# Patient Record
Sex: MALE | Race: BLACK OR AFRICAN AMERICAN | NOT HISPANIC OR LATINO | Employment: FULL TIME | URBAN - METROPOLITAN AREA
[De-identification: names, ages, dates, MRNs, and addresses within clinical notes are randomized per-mention and may not be internally consistent; named-entity substitution may affect disease eponyms.]

---

## 2018-12-18 ENCOUNTER — OFFICE VISIT (OUTPATIENT)
Dept: URGENT CARE | Facility: PHYSICIAN GROUP | Age: 73
End: 2018-12-18
Payer: COMMERCIAL

## 2018-12-18 VITALS
DIASTOLIC BLOOD PRESSURE: 80 MMHG | BODY MASS INDEX: 23.98 KG/M2 | HEART RATE: 92 BPM | RESPIRATION RATE: 20 BRPM | SYSTOLIC BLOOD PRESSURE: 142 MMHG | WEIGHT: 177 LBS | TEMPERATURE: 100 F | HEIGHT: 72 IN | OXYGEN SATURATION: 97 %

## 2018-12-18 DIAGNOSIS — J01.00 ACUTE NON-RECURRENT MAXILLARY SINUSITIS: ICD-10-CM

## 2018-12-18 PROCEDURE — 99204 OFFICE O/P NEW MOD 45 MIN: CPT | Performed by: FAMILY MEDICINE

## 2018-12-18 RX ORDER — BENZONATATE 100 MG/1
100 CAPSULE ORAL 3 TIMES DAILY PRN
Qty: 60 CAP | Refills: 0 | Status: SHIPPED | OUTPATIENT
Start: 2018-12-18 | End: 2020-11-07

## 2018-12-18 RX ORDER — AMOXICILLIN AND CLAVULANATE POTASSIUM 875; 125 MG/1; MG/1
1 TABLET, FILM COATED ORAL 2 TIMES DAILY
Qty: 14 TAB | Refills: 0 | Status: SHIPPED | OUTPATIENT
Start: 2018-12-18 | End: 2018-12-25

## 2018-12-18 NOTE — PROGRESS NOTES
Subjective:     Ulysses Coleman is a 73 y.o. male who presents for Cough ([id number].)       Cough   This is a new problem. The current episode started 1 to 4 weeks ago. The problem has been rapidly worsening. The problem occurs every few minutes. The cough is productive of sputum. Associated symptoms include nasal congestion and postnasal drip. Pertinent negatives include no chills, fever, rhinorrhea, sore throat or wheezing.   History reviewed. No pertinent past medical history.History reviewed. No pertinent surgical history.  Social History     Social History   • Marital status:      Spouse name: N/A   • Number of children: N/A   • Years of education: N/A     Occupational History   • Not on file.     Social History Main Topics   • Smoking status: Never Smoker   • Smokeless tobacco: Never Used   • Alcohol use No   • Drug use: No   • Sexual activity: Not on file     Other Topics Concern   • Not on file     Social History Narrative   • No narrative on file      Family History   Problem Relation Age of Onset   • Stroke Neg Hx    • Heart Disease Neg Hx     Review of Systems   Constitutional: Negative for chills and fever.   HENT: Positive for postnasal drip and sinus pain. Negative for rhinorrhea and sore throat.    Respiratory: Positive for cough. Negative for wheezing.    No Known Allergies   Objective:   /80   Pulse 92   Temp 37.8 °C (100 °F) (Temporal)   Resp 20   Ht 1.829 m (6')   Wt 80.3 kg (177 lb)   SpO2 97%   BMI 24.01 kg/m²   Physical Exam   Constitutional: He is oriented to person, place, and time. He appears well-developed and well-nourished. No distress.   HENT:   Head: Normocephalic and atraumatic.   Nose: Mucosal edema and rhinorrhea present. Right sinus exhibits maxillary sinus tenderness. Left sinus exhibits maxillary sinus tenderness.   Eyes: Pupils are equal, round, and reactive to light. Conjunctivae and EOM are normal.   Cardiovascular: Normal rate and regular rhythm.     No murmur heard.  Pulmonary/Chest: Effort normal and breath sounds normal. No respiratory distress.   Abdominal: Soft. He exhibits no distension. There is no tenderness.   Neurological: He is alert and oriented to person, place, and time. He has normal reflexes. No sensory deficit.   Skin: Skin is warm and dry.   Psychiatric: He has a normal mood and affect.   Vitals reviewed.        Assessment/Plan:   Assessment    1. Acute non-recurrent maxillary sinusitis  - amoxicillin-clavulanate (AUGMENTIN) 875-125 MG Tab; Take 1 Tab by mouth 2 times a day for 7 days.  Dispense: 14 Tab; Refill: 0  - benzonatate (TESSALON) 100 MG Cap; Take 1 Cap by mouth 3 times a day as needed for Cough.  Dispense: 60 Cap; Refill: 0    Other orders  - DM-Doxylamine-Acetaminophen (NYQUIL COLD & FLU PO); Take  by mouth.    Differential diagnosis, natural history, supportive care, and indications for immediate follow-up discussed.

## 2018-12-18 NOTE — PATIENT INSTRUCTIONS

## 2018-12-31 ASSESSMENT — ENCOUNTER SYMPTOMS
WHEEZING: 0
SINUS PAIN: 1
CHILLS: 0
RHINORRHEA: 0
COUGH: 1
FEVER: 0
SORE THROAT: 0

## 2019-04-01 ENCOUNTER — HOSPITAL ENCOUNTER (OUTPATIENT)
Dept: LAB | Facility: MEDICAL CENTER | Age: 74
End: 2019-04-01
Attending: FAMILY MEDICINE
Payer: COMMERCIAL

## 2019-04-01 ENCOUNTER — HOSPITAL ENCOUNTER (OUTPATIENT)
Facility: MEDICAL CENTER | Age: 74
End: 2019-04-01
Attending: FAMILY MEDICINE
Payer: COMMERCIAL

## 2019-04-01 LAB
25(OH)D3 SERPL-MCNC: 33 NG/ML (ref 30–100)
ALBUMIN SERPL BCP-MCNC: 4.1 G/DL (ref 3.2–4.9)
ALBUMIN/GLOB SERPL: 1.5 G/DL
ALP SERPL-CCNC: 53 U/L (ref 30–99)
ALT SERPL-CCNC: 14 U/L (ref 2–50)
ANION GAP SERPL CALC-SCNC: 4 MMOL/L (ref 0–11.9)
AST SERPL-CCNC: 21 U/L (ref 12–45)
BASOPHILS # BLD AUTO: 0.9 % (ref 0–1.8)
BASOPHILS # BLD: 0.03 K/UL (ref 0–0.12)
BILIRUB SERPL-MCNC: 0.7 MG/DL (ref 0.1–1.5)
BUN SERPL-MCNC: 17 MG/DL (ref 8–22)
CALCIUM SERPL-MCNC: 9.1 MG/DL (ref 8.5–10.5)
CHLORIDE SERPL-SCNC: 105 MMOL/L (ref 96–112)
CHOLEST SERPL-MCNC: 176 MG/DL (ref 100–199)
CO2 SERPL-SCNC: 29 MMOL/L (ref 20–33)
CREAT SERPL-MCNC: 0.83 MG/DL (ref 0.5–1.4)
EOSINOPHIL # BLD AUTO: 0.08 K/UL (ref 0–0.51)
EOSINOPHIL NFR BLD: 2.3 % (ref 0–6.9)
ERYTHROCYTE [DISTWIDTH] IN BLOOD BY AUTOMATED COUNT: 47.1 FL (ref 35.9–50)
GLOBULIN SER CALC-MCNC: 2.8 G/DL (ref 1.9–3.5)
GLUCOSE SERPL-MCNC: 96 MG/DL (ref 65–99)
HCT VFR BLD AUTO: 45.5 % (ref 42–52)
HDLC SERPL-MCNC: 59 MG/DL
HGB BLD-MCNC: 14.6 G/DL (ref 14–18)
IMM GRANULOCYTES # BLD AUTO: 0.01 K/UL (ref 0–0.11)
IMM GRANULOCYTES NFR BLD AUTO: 0.3 % (ref 0–0.9)
LDLC SERPL CALC-MCNC: 100 MG/DL
LYMPHOCYTES # BLD AUTO: 1.27 K/UL (ref 1–4.8)
LYMPHOCYTES NFR BLD: 37 % (ref 22–41)
MCH RBC QN AUTO: 29.7 PG (ref 27–33)
MCHC RBC AUTO-ENTMCNC: 32.1 G/DL (ref 33.7–35.3)
MCV RBC AUTO: 92.5 FL (ref 81.4–97.8)
MONOCYTES # BLD AUTO: 0.36 K/UL (ref 0–0.85)
MONOCYTES NFR BLD AUTO: 10.5 % (ref 0–13.4)
NEUTROPHILS # BLD AUTO: 1.68 K/UL (ref 1.82–7.42)
NEUTROPHILS NFR BLD: 49 % (ref 44–72)
NRBC # BLD AUTO: 0 K/UL
NRBC BLD-RTO: 0 /100 WBC
PLATELET # BLD AUTO: 173 K/UL (ref 164–446)
PMV BLD AUTO: 11.3 FL (ref 9–12.9)
POTASSIUM SERPL-SCNC: 4.5 MMOL/L (ref 3.6–5.5)
PROT SERPL-MCNC: 6.9 G/DL (ref 6–8.2)
PSA SERPL-MCNC: 0.75 NG/ML (ref 0–4)
RBC # BLD AUTO: 4.92 M/UL (ref 4.7–6.1)
SODIUM SERPL-SCNC: 138 MMOL/L (ref 135–145)
TRIGL SERPL-MCNC: 83 MG/DL (ref 0–149)
WBC # BLD AUTO: 3.4 K/UL (ref 4.8–10.8)

## 2019-04-01 PROCEDURE — 82306 VITAMIN D 25 HYDROXY: CPT

## 2019-04-01 PROCEDURE — 84153 ASSAY OF PSA TOTAL: CPT

## 2019-04-01 PROCEDURE — 82272 OCCULT BLD FECES 1-3 TESTS: CPT

## 2019-04-01 PROCEDURE — 80061 LIPID PANEL: CPT

## 2019-04-01 PROCEDURE — 80053 COMPREHEN METABOLIC PANEL: CPT

## 2019-04-01 PROCEDURE — 85025 COMPLETE CBC W/AUTO DIFF WBC: CPT

## 2019-04-01 PROCEDURE — 36415 COLL VENOUS BLD VENIPUNCTURE: CPT

## 2019-04-02 LAB — HEMOCCULT STL QL: NEGATIVE

## 2019-08-26 ENCOUNTER — OFFICE VISIT (OUTPATIENT)
Dept: URGENT CARE | Facility: PHYSICIAN GROUP | Age: 74
End: 2019-08-26
Payer: COMMERCIAL

## 2019-08-26 VITALS
RESPIRATION RATE: 16 BRPM | WEIGHT: 170 LBS | OXYGEN SATURATION: 99 % | BODY MASS INDEX: 23.03 KG/M2 | TEMPERATURE: 97.8 F | HEIGHT: 72 IN | HEART RATE: 70 BPM | SYSTOLIC BLOOD PRESSURE: 152 MMHG | DIASTOLIC BLOOD PRESSURE: 102 MMHG
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
## 2019-08-26 PROCEDURE — 99214 OFFICE O/P EST MOD 30 MIN: CPT | Performed by: PHYSICIAN ASSISTANT

## 2019-08-26 RX ORDER — CEPHALEXIN 500 MG/1
500 CAPSULE ORAL 3 TIMES DAILY
Qty: 30 CAP | Refills: 0 | Status: SHIPPED | OUTPATIENT
Start: 2019-08-26 | End: 2019-09-05

## 2019-08-26 ASSESSMENT — ENCOUNTER SYMPTOMS
FEVER: 0
FATIGUE: 0
SHORTNESS OF BREATH: 0
SORE THROAT: 0
COUGH: 0

## 2019-08-27 NOTE — PROGRESS NOTES
Subjective:   Ulysses Coleman is a 73 y.o. male who presents for Bee Sting (R [id number] )        This is a new problem.  Patient complains of right hand redness and swelling x1 day.  He states that he was stung by the on dorsal aspect of right hand 2 days ago.  Symptoms are worsening.    Rash   This is a new problem. The current episode started yesterday. The problem has been rapidly worsening since onset. The affected locations include the right hand. The rash is characterized by pain, redness and swelling (warmth). Associated with: insect. Pertinent negatives include no congestion, cough, fatigue, fever, shortness of breath or sore throat. Past treatments include nothing. The treatment provided no relief. There is no history of allergies.     Review of Systems   Constitutional: Negative for fatigue and fever.   HENT: Negative for congestion and sore throat.    Respiratory: Negative for cough and shortness of breath.    Skin: Positive for rash.       PMH:  has no past medical history of Asthma, Cancer (HCC), Diabetes (HCC), Hyperlipidemia, or Hypertension.  MEDS:   Current Outpatient Medications:   •  cephALEXin (KEFLEX) 500 MG Cap, Take 1 Cap by mouth 3 times a day for 10 days., Disp: 30 Cap, Rfl: 0  •  DM-Doxylamine-Acetaminophen (NYQUIL COLD & FLU PO), Take  by mouth., Disp: , Rfl:   •  benzonatate (TESSALON) 100 MG Cap, Take 1 Cap by mouth 3 times a day as needed for Cough. (Patient not taking: Reported on 8/26/2019), Disp: 60 Cap, Rfl: 0  ALLERGIES: No Known Allergies  SURGHX: No past surgical history on file.  SOCHX:  reports that he has never smoked. He has never used smokeless tobacco. He reports that he does not drink alcohol or use drugs.  FH: Family history was reviewed, no pertinent findings to report   Objective:   /102   Pulse 70   Temp 36.6 °C (97.8 °F) (Temporal)   Resp 16   Ht 1.829 m (6')   Wt 77.1 kg (170 lb)   SpO2 99%   BMI 23.06 kg/m²   Physical Exam   Constitutional: He is  oriented to person, place, and time. He appears well-developed and well-nourished.  Non-toxic appearance. No distress.   HENT:   Head: Normocephalic and atraumatic.   Right Ear: External ear normal.   Left Ear: External ear normal.   Nose: Nose normal.   Eyes: Conjunctivae and lids are normal.   Neck: Neck supple.   Cardiovascular: Normal rate and regular rhythm.   Pulmonary/Chest: Effort normal and breath sounds normal. No respiratory distress.   Abdominal: Normal appearance.   Musculoskeletal:        Right hand: He exhibits tenderness and swelling. He exhibits normal range of motion, no bony tenderness, normal two-point discrimination, normal capillary refill, no deformity and no laceration. Normal sensation noted. Decreased sensation is not present in the ulnar distribution, is not present in the medial distribution and is not present in the radial distribution. Normal strength noted.   Normal range of motion. Exhibits no edema and no tenderness.    Neurological: He is alert and oriented to person, place, and time. No cranial nerve deficit or sensory deficit.   Skin: Skin is warm, dry and intact. Capillary refill takes less than 2 seconds.   Moderate edema, erythema, and warmth of the dorsal aspect of right hand.  There is a small central punctate lesion on the lateral aspect.   Psychiatric: He has a normal mood and affect. His speech is normal and behavior is normal. Judgment and thought content normal. Cognition and memory are normal.   Vitals reviewed.        Assessment/Plan:   1. Bug bite with infection, initial encounter  - cephALEXin (KEFLEX) 500 MG Cap; Take 1 Cap by mouth 3 times a day for 10 days.  Dispense: 30 Cap; Refill: 0    Pt instructed to complete full course of medication despite symptomatic improvement. Pt to take med with meals to alleviate GI upset. Pt to take a probiotic or eat Aubrie’s yogurt/ kefir while taking the medication.    Signs of infection, to include: redness, swelling, increased  pain, purulent discharge, red streaking from wound site, N/V, and F/C discussed with patient.  Pt instructed to RTC or go to the ER if he or she should experience these.    Differential diagnosis, natural history, supportive care, and indications for immediate follow-up discussed.

## 2019-11-29 ENCOUNTER — OFFICE VISIT (OUTPATIENT)
Dept: URGENT CARE | Facility: PHYSICIAN GROUP | Age: 74
End: 2019-11-29
Payer: COMMERCIAL

## 2019-11-29 VITALS
HEART RATE: 62 BPM | SYSTOLIC BLOOD PRESSURE: 136 MMHG | DIASTOLIC BLOOD PRESSURE: 90 MMHG | TEMPERATURE: 98.3 F | OXYGEN SATURATION: 97 % | WEIGHT: 180 LBS | RESPIRATION RATE: 15 BRPM | BODY MASS INDEX: 24.38 KG/M2 | HEIGHT: 72 IN

## 2019-11-29 DIAGNOSIS — L01.00 IMPETIGO: ICD-10-CM

## 2019-11-29 PROCEDURE — 99213 OFFICE O/P EST LOW 20 MIN: CPT | Performed by: FAMILY MEDICINE

## 2019-11-29 RX ORDER — CLINDAMYCIN HYDROCHLORIDE 300 MG/1
300 CAPSULE ORAL 4 TIMES DAILY
Qty: 28 CAP | Refills: 0 | Status: SHIPPED | OUTPATIENT
Start: 2019-11-29 | End: 2019-12-06

## 2019-11-29 ASSESSMENT — ENCOUNTER SYMPTOMS
FEVER: 0
VOMITING: 0
SORE THROAT: 0
SHORTNESS OF BREATH: 0
MYALGIAS: 0
DIZZINESS: 0
NAUSEA: 0
CHILLS: 0
PAIN: 1
EYE PAIN: 0

## 2019-11-29 NOTE — LETTER
November 29, 2019         Patient: Ulysses Coleman   YOB: 1945   Date of Visit: 11/29/2019           To Whom it May Concern:    Ulysses Coleman was seen in my clinic on 11/29/2019. He may return to work on 11/30/2019..    If you have any questions or concerns, please don't hesitate to call.        Sincerely,           Niko Ackerman M.D.  Electronically Signed

## 2019-11-30 NOTE — PROGRESS NOTES
"Subjective:   Ulysses Coleman is a 74 y.o. male who presents for Pain (nose swelling)        74-year-old male presents to the urgent care with chief complaint of rash on the nose.  The patient has parents similar rash described as\" cold sores\" in the past.  Patient states the rash lesions are mildly painful and itchy.  Denies fevers chills or sweats    Pain   Associated symptoms include a rash. Pertinent negatives include no chest pain, chills, fever, myalgias, nausea, sore throat or vomiting.     Review of Systems   Constitutional: Negative for chills and fever.   HENT: Negative for sore throat.    Eyes: Negative for pain.   Respiratory: Negative for shortness of breath.    Cardiovascular: Negative for chest pain.   Gastrointestinal: Negative for nausea and vomiting.   Genitourinary: Negative for hematuria.   Musculoskeletal: Negative for myalgias.   Skin: Positive for rash.   Neurological: Negative for dizziness.     No Known Allergies   Objective:   /90   Pulse 62   Temp 36.8 °C (98.3 °F)   Resp 15   Ht 1.829 m (6')   Wt 81.6 kg (180 lb)   SpO2 97%   BMI 24.41 kg/m²   Physical Exam  Vitals signs and nursing note reviewed.   Constitutional:       General: He is not in acute distress.     Appearance: He is well-developed.   HENT:      Head: Normocephalic and atraumatic.   Eyes:      Conjunctiva/sclera: Conjunctivae normal.      Pupils: Pupils are equal, round, and reactive to light.   Cardiovascular:      Rate and Rhythm: Normal rate and regular rhythm.      Heart sounds: No murmur.   Pulmonary:      Effort: Pulmonary effort is normal. No respiratory distress.      Breath sounds: Normal breath sounds.   Abdominal:      General: There is no distension.      Palpations: Abdomen is soft.      Tenderness: There is no tenderness.   Musculoskeletal: Normal range of motion.   Skin:     General: Skin is warm and dry.      Findings: Erythema and rash (Sue-alar nasal rash, crusting lesion, trace exudate, no " vesicles) present.   Neurological:      General: No focal deficit present.      Mental Status: He is alert and oriented to person, place, and time. Mental status is at baseline.      Gait: Gait (gait at baseline) normal.   Psychiatric:         Judgment: Judgment normal.           Assessment/Plan:   1. Impetigo  - clindamycin (CLEOCIN) 300 MG Cap; Take 1 Cap by mouth 4 times a day for 7 days.  Dispense: 28 Cap; Refill: 0  - mupirocin (BACTROBAN) 2 % Ointment; Apply 1 Application to affected area(s) every day for 14 days.  Dispense: 30 g; Refill: 0        Differential diagnosis, natural history, supportive care, and indications for immediate follow-up discussed.     Advised the patient to follow-up with the primary care physician for recheck, reevaluation, and consideration of further management.

## 2019-11-30 NOTE — PATIENT INSTRUCTIONS
Impetigo, Adult  Impetigo is an infection of the skin. It commonly occurs in young children, but it can also occur in adults. The infection causes itchy blisters and sores that produce brownish-yellow fluid. As the fluid dries, it forms a thick, honey-colored crust. These skin changes usually occur on the face but can also affect other areas of the body. Impetigo usually goes away in 7-10 days with treatment.  CAUSES  Impetigo is caused by two types of bacteria. It may be caused by staphylococci or streptococci bacteria. These bacteria cause impetigo when they get under the surface of the skin. This often happens after some damage to the skin, such as damage from:  · Cuts, scrapes, or scratches.  · Insect bites, especially when you scratch the area of a bite.  · Chickenpox or other illnesses that cause open skin sores.  · Nail biting or chewing.  Impetigo is contagious and can spread easily from one person to another. This may occur through close skin contact or by sharing towels, clothing, or other items with a person who has the infection.  RISK FACTORS  Some things that can increase the risk of getting this infection include:  · Playing sports that include skin-to-skin contact with others.  · Having a skin condition with open sores.  · Having many skin cuts or scrapes.  · Living in an area that has high humidity levels.  · Having poor hygiene.  · Having high levels of staphylococci in your nose.  SIGNS AND SYMPTOMS  Impetigo usually starts out as small blisters, often on the face. The blisters then break open and turn into tiny sores (lesions) with a yellow crust. In some cases, the blisters cause itching or burning. With scratching, irritation, or lack of treatment, these small lesions may get larger. Scratching can also cause impetigo to spread to other parts of the body. The bacteria can get under the fingernails and spread when you touch another area of your skin.  Other possible symptoms include:  · Larger  blisters.  · Pus.  · Swollen lymph glands.  DIAGNOSIS  This condition is usually diagnosed during a physical exam. A skin sample or sample of fluid from a blister may be taken for lab tests that involve growing bacteria (culture test). This can help confirm the diagnosis or help determine the best treatment.  TREATMENT  Mild impetigo can be treated with prescription antibiotic cream. Oral antibiotic medicine may be used in more severe cases. Medicines for itching may also be used.  HOME CARE INSTRUCTIONS  · Take medicines only as directed by your health care provider.  · To help prevent impetigo from spreading to other body areas:  ¨ Keep your fingernails short and clean.  ¨ Do not scratch the blisters or sores.  ¨ Cover infected areas, if necessary, to keep from scratching.  · Gently wash the infected areas with antibiotic soap and water.  · Soak crusted areas in warm, soapy water using antibiotic soap.  ¨ Gently rub the areas to remove crusts. Do not scrub.  · Wash your hands often to avoid spreading this infection.  · Stay home until you have used an antibiotic cream for 48 hours (2 days) or an oral antibiotic medicine for 24 hours (1 day). You should only return to work and activities with other people if your skin shows significant improvement.  PREVENTION   To keep the infection from spreading:  · Stay home until you have used an antibiotic cream for 48 hours or an oral antibiotic for 24 hours.  · Wash your hands often.  · Do not engage in skin-to-skin contact with other people while you have still have blisters.  · Do not share towels, washcloths, or bedding with others while you have the infection.  SEEK MEDICAL CARE IF:  · You develop more blisters or sores despite treatment.  · Other family members get sores.  · Your skin sores are not improving after 48 hours of treatment.  · You have a fever.  SEEK IMMEDIATE MEDICAL CARE IF:  · You see spreading redness or swelling of the skin around your sores.  · You  see red streaks coming from your sores.  · You develop a sore throat.  This information is not intended to replace advice given to you by your health care provider. Make sure you discuss any questions you have with your health care provider.  Document Released: 01/08/2016 Document Reviewed: 01/08/2016  [employer] Interactive Patient Education © 2017 [employer]

## 2020-03-12 ENCOUNTER — OFFICE VISIT (OUTPATIENT)
Dept: URGENT CARE | Facility: PHYSICIAN GROUP | Age: 75
End: 2020-03-12
Payer: COMMERCIAL

## 2020-03-12 ENCOUNTER — HOSPITAL ENCOUNTER (OUTPATIENT)
Dept: RADIOLOGY | Facility: MEDICAL CENTER | Age: 75
End: 2020-03-12
Attending: FAMILY MEDICINE
Payer: COMMERCIAL

## 2020-03-12 VITALS
WEIGHT: 179 LBS | HEART RATE: 52 BPM | SYSTOLIC BLOOD PRESSURE: 142 MMHG | TEMPERATURE: 97.6 F | DIASTOLIC BLOOD PRESSURE: 60 MMHG | OXYGEN SATURATION: 97 % | BODY MASS INDEX: 24.24 KG/M2 | HEIGHT: 72 IN | RESPIRATION RATE: 12 BRPM

## 2020-03-12 DIAGNOSIS — M79.641 BILATERAL HAND PAIN: ICD-10-CM

## 2020-03-12 DIAGNOSIS — M79.642 BILATERAL HAND PAIN: ICD-10-CM

## 2020-03-12 DIAGNOSIS — M19.042 PRIMARY OSTEOARTHRITIS OF BOTH HANDS: ICD-10-CM

## 2020-03-12 DIAGNOSIS — M19.041 PRIMARY OSTEOARTHRITIS OF BOTH HANDS: ICD-10-CM

## 2020-03-12 PROCEDURE — 77077 JOINT SURVEY SINGLE VIEW: CPT

## 2020-03-12 PROCEDURE — 99214 OFFICE O/P EST MOD 30 MIN: CPT | Performed by: FAMILY MEDICINE

## 2020-03-12 RX ORDER — IBUPROFEN 200 MG
200 TABLET ORAL EVERY 6 HOURS PRN
COMMUNITY
End: 2020-11-07

## 2020-03-12 ASSESSMENT — FIBROSIS 4 INDEX: FIB4 SCORE: 2.43

## 2020-03-12 ASSESSMENT — PAIN SCALES - GENERAL: PAINLEVEL: 8=MODERATE-SEVERE PAIN

## 2020-03-12 NOTE — PROGRESS NOTES
Subjective:      Chief Complaint   Patient presents with   • Hand Pain     Bilateral hand Px x 4 months              hand Injury            c/o intermittent, mild, dull, achy bilat  hand pain, worse when he tries to use the hands x 4 mth.       The pain does not radiate. The pain is mild. Pertinent negatives include no chest pain, muscle weakness, numbness or tingling.  . Pt has tried nothing for the symptoms.     Past medical history was unremarkable and not pertinent to current issue  Social hx - denies tobacco, alcohol, drug use  Family hx was reviewed - no pertinent past family hx      Review of Systems   Constitutional: Negative for fever.   Respiratory: Negative for shortness of breath.    Cardiovascular: Negative for chest pain.   Neurological: Negative for tingling and numbness.   10 point ROS otherwise negative, except per HPI           Objective:     /60 (BP Location: Left arm, Patient Position: Sitting, BP Cuff Size: Adult)   Pulse (!) 52   Temp 36.4 °C (97.6 °F) (Temporal)   Resp 12   Ht 1.829 m (6')   Wt 81.2 kg (179 lb)   SpO2 97%       Physical Exam   Constitutional: pt is oriented to person, place, and time. Pt appears well-developed and well-nourished. No distress.   HENT:   Head: Normocephalic and atraumatic.   Eyes: Conjunctivae are normal.   Cardiovascular: Normal rate.    Pulmonary/Chest: Effort normal.   Musculoskeletal:        Left hand :        Left hand: Normal sensation noted. Normal strength noted.  + TTP over 3rd/4th MCP.  normal range of motion and no crepitus.  No swelling or erythema       Rt hand :         Normal sensation noted. Normal strength noted.  + TTP base of thumb.  normal range of motion and no crepitus.  No swelling or erythema  Neurological: pt is alert and oriented to person, place, and time.   Skin: Skin is warm. Pt is not diaphoretic. No erythema.   Nursing note and vitals reviewed.       Details     Reading Physician Reading Date Result Priority   Dharmesh HOPE  DARWIN Thomas  [phone] 3/12/2020 [facility]      Narrative & Impression        3/12/2020 11:35 AM     HISTORY/REASON FOR EXAM:  Decreased flexibility fourth digit left hand right hand pain centered in first metacarpal.        TECHNIQUE/EXAM DESCRIPTION AND NUMBER OF VIEWS:  Joint survey, single view of the hands.     COMPARISON: None     FINDINGS:  There is no evidence of acute fracture. There is no evidence of dislocation.  No focal bone erosions are appreciated around the joint spaces.  No joint surface irregularity is identified.  There is mild joint space narrowing and periarticular sclerosis.  No calcified or ossified soft tissue mass is identified.     IMPRESSION:     1.  No acute fracture is identified. No bone erosions are identified.     2.  Findings are consistent with mild osteoarthritis.            Assessment/Plan:      1.  Primary osteoarthritis of both hands  X-rays were personally reviewed by myself.   There is no fracture, just arthritic changes as noted above.      - Diclofenac Sodium (VOLTAREN) 1 % Gel; Apply 4 g to skin as directed 3 times a day as needed.  Dispense: 1 Tube; Refill: 0  - REFERRAL TO FOLLOW-UP WITH PRIMARY CARE

## 2020-04-06 ENCOUNTER — TELEPHONE (OUTPATIENT)
Dept: SCHEDULING | Facility: IMAGING CENTER | Age: 75
End: 2020-04-06

## 2020-11-07 ENCOUNTER — OFFICE VISIT (OUTPATIENT)
Dept: URGENT CARE | Facility: PHYSICIAN GROUP | Age: 75
End: 2020-11-07
Payer: COMMERCIAL

## 2020-11-07 ENCOUNTER — HOSPITAL ENCOUNTER (OUTPATIENT)
Dept: LAB | Facility: MEDICAL CENTER | Age: 75
End: 2020-11-07
Attending: NURSE PRACTITIONER
Payer: COMMERCIAL

## 2020-11-07 VITALS
TEMPERATURE: 97.6 F | OXYGEN SATURATION: 99 % | WEIGHT: 181 LBS | HEART RATE: 72 BPM | RESPIRATION RATE: 14 BRPM | DIASTOLIC BLOOD PRESSURE: 64 MMHG | HEIGHT: 72 IN | BODY MASS INDEX: 24.52 KG/M2 | SYSTOLIC BLOOD PRESSURE: 150 MMHG

## 2020-11-07 DIAGNOSIS — J06.9 UPPER RESPIRATORY INFECTION WITH COUGH AND CONGESTION: ICD-10-CM

## 2020-11-07 LAB
FLUAV+FLUBV AG SPEC QL IA: NEGATIVE
INT CON NEG: NORMAL
INT CON POS: NORMAL

## 2020-11-07 PROCEDURE — 99213 OFFICE O/P EST LOW 20 MIN: CPT | Performed by: NURSE PRACTITIONER

## 2020-11-07 PROCEDURE — 87804 INFLUENZA ASSAY W/OPTIC: CPT | Performed by: NURSE PRACTITIONER

## 2020-11-07 PROCEDURE — U0003 INFECTIOUS AGENT DETECTION BY NUCLEIC ACID (DNA OR RNA); SEVERE ACUTE RESPIRATORY SYNDROME CORONAVIRUS 2 (SARS-COV-2) (CORONAVIRUS DISEASE [COVID-19]), AMPLIFIED PROBE TECHNIQUE, MAKING USE OF HIGH THROUGHPUT TECHNOLOGIES AS DESCRIBED BY CMS-2020-01-R: HCPCS

## 2020-11-07 ASSESSMENT — FIBROSIS 4 INDEX: FIB4 SCORE: 2.43

## 2020-11-07 NOTE — PROGRESS NOTES
Chief Complaint   Patient presents with   • Nasal Congestion     x3 days, chest congestion,        HISTORY OF PRESENT ILLNESS: Patient is a 74 y.o. male with who presents today due to symptoms which started three days ago. Pt reports a dry cough, congestion, shortness of breath, sore throat, diarrhea, fever, chills, fatigue, malaise, and body aches. Denies chest pain, shortness of breath, or wheezing. Denies h/o asthma/copd/CAP. No immunocompromise. Has tried OTC cold medications without significant relief of symptoms. No recent ABX use. No other aggravating or alleviating factors. 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       There are no active problems to display for this patient.      Allergies:Patient has no known allergies.    Current Outpatient Medications Ordered in Epic   Medication Sig Dispense Refill   • DM-Doxylamine-Acetaminophen (NYQUIL COLD & FLU PO) Take  by mouth.       No current Epic-ordered facility-administered medications on file.        History reviewed. No pertinent past medical history.    Social History     Tobacco Use   • Smoking status: Never Smoker   • Smokeless tobacco: Never Used   Substance Use Topics   • Alcohol use: Yes     Comment: occ   • Drug use: No       Family Status   Relation Name Status   • Neg Hx  (Not Specified)     Family History   Problem Relation Age of Onset   • Stroke Neg Hx    • Heart Disease Neg Hx        ROS:  Review of Systems   Constitutional: Positive for subjective fever, chills, fatigue, malaise. Negative for weight loss.  HENT: Positive for congestion, sore throat. Negative for ear pain, nosebleeds, and neck pain.    Eyes: Negative for vision changes.   Cardiovascular: Negative for chest pain, palpitations, orthopnea and leg swelling.   Respiratory: Positive for cough without sputum production, shortness of breath. Negative for wheezing.   Gastrointestinal: Positive for nausea. Negative for abdominal pain, vomiting or  diarrhea.   Skin: Negative for rash, diaphoresis.     Exam:  /64   Pulse 72   Temp 36.4 °C (97.6 °F) (Temporal)   Resp 14   Ht 1.829 m (6')   Wt 82.1 kg (181 lb)   SpO2 99%   General: well-nourished, well-developed male in NAD  Head: normocephalic, atraumatic  Eyes: PERRLA, EOM within normal limits, no conjunctival injection, no scleral icterus, visual fields and acuity grossly intact.  Ears: normal shape and symmetry, no tenderness, no discharge. External canals are without any significant edema or erythema. Tympanic membranes are without any inflammation, no effusion. Gross auditory acuity is intact.  Nose: symmetrical without tenderness, mild discharge, erythema present bilateral nares.  Mouth/Throat: reasonable hygiene, no exudates or tonsillar enlargement. Mild erythema present.   Neck: no masses, range of motion within normal limits, no tracheal deviation.  Lymph: mild cervical adenopathy. No supraclavicular adenopathy.   Neuro: alert and oriented. Cranial nerves 1-12 grossly intact.   Cardiovascular: regular rate and rhythm without murmurs, rubs, or gallops. No edema.   Pulmonary: no distress. Chest is symmetrical with respiration. No wheezes, crackles, or rhonchi.   Musculoskeletal: appropriate muscle tone, gait is stable.  GI: soft, non-tender, no guarding, no hepatosplenomegaly.   Skin: warm, dry, intact, no clubbing, no cyanosis.   Psych: appropriate mood, affect, judgement.       POC flu negative      Assessment/Plan:  1. Upper respiratory infection with cough and congestion  POCT Influenza A/B    COVID/SARS CoV-2 PCR           Discussed symptoms most likely viral, will test for COVID. Home quarantine advised. Discussed natural progression and sx care.  Rest, increase fluids, hand and respiratory hygiene. May take OTC medications as directed for symptom relief. STRICT ER precautions.   Supportive care, differential diagnoses, and indications for immediate follow-up discussed with patient.    Pathogenesis of diagnosis discussed including typical length and natural progression.  Instructed to return to clinic or nearest emergency department for any change in condition, further concerns, or worsening of symptoms.  Patient states understanding of the plan of care and discharge instructions.          YOVANY Foster.

## 2020-11-08 LAB — COVID ORDER STATUS COVID19: NORMAL

## 2020-11-09 LAB
SARS-COV-2 RNA RESP QL NAA+PROBE: DETECTED
SPECIMEN SOURCE: ABNORMAL

## 2021-01-13 DIAGNOSIS — Z23 NEED FOR VACCINATION: ICD-10-CM

## 2022-04-04 ENCOUNTER — HOSPITAL ENCOUNTER (OUTPATIENT)
Dept: RADIOLOGY | Facility: MEDICAL CENTER | Age: 77
End: 2022-04-04
Attending: NURSE PRACTITIONER
Payer: COMMERCIAL

## 2022-04-04 ENCOUNTER — OFFICE VISIT (OUTPATIENT)
Dept: URGENT CARE | Facility: PHYSICIAN GROUP | Age: 77
End: 2022-04-04
Payer: COMMERCIAL

## 2022-04-04 VITALS
TEMPERATURE: 97.3 F | BODY MASS INDEX: 23.7 KG/M2 | HEIGHT: 72 IN | SYSTOLIC BLOOD PRESSURE: 124 MMHG | RESPIRATION RATE: 12 BRPM | DIASTOLIC BLOOD PRESSURE: 70 MMHG | WEIGHT: 175 LBS | HEART RATE: 63 BPM | OXYGEN SATURATION: 99 %

## 2022-04-04 DIAGNOSIS — R05.3 PERSISTENT COUGH FOR 3 WEEKS OR LONGER: ICD-10-CM

## 2022-04-04 DIAGNOSIS — R19.09 BULGE IN GROIN AREA: ICD-10-CM

## 2022-04-04 DIAGNOSIS — K40.90 RIGHT INGUINAL HERNIA: ICD-10-CM

## 2022-04-04 DIAGNOSIS — Z00.00 HEALTH CARE MAINTENANCE: ICD-10-CM

## 2022-04-04 PROCEDURE — 71046 X-RAY EXAM CHEST 2 VIEWS: CPT

## 2022-04-04 PROCEDURE — 99214 OFFICE O/P EST MOD 30 MIN: CPT | Performed by: NURSE PRACTITIONER

## 2022-04-04 PROCEDURE — 76857 US EXAM PELVIC LIMITED: CPT

## 2022-04-04 RX ORDER — DOXYCYCLINE HYCLATE 100 MG
100 TABLET ORAL 2 TIMES DAILY
Qty: 14 TABLET | Refills: 0 | Status: SHIPPED | OUTPATIENT
Start: 2022-04-04 | End: 2022-04-11

## 2022-04-04 RX ORDER — DEXTROMETHORPHAN HYDROBROMIDE AND PROMETHAZINE HYDROCHLORIDE 15; 6.25 MG/5ML; MG/5ML
5 SYRUP ORAL EVERY 4 HOURS PRN
Qty: 100 ML | Refills: 0 | Status: SHIPPED | OUTPATIENT
Start: 2022-04-04 | End: 2022-04-11

## 2022-04-04 ASSESSMENT — ENCOUNTER SYMPTOMS
SINUS PAIN: 0
COUGH: 1
SORE THROAT: 1
HEARTBURN: 0
CHILLS: 0
SHORTNESS OF BREATH: 0
HEMOPTYSIS: 0
SPUTUM PRODUCTION: 0
FEVER: 0
ABDOMINAL PAIN: 0

## 2022-04-04 NOTE — PATIENT INSTRUCTIONS
Inguinal Hernia, Adult  An inguinal hernia develops when fat or the intestines push through a weak spot in a muscle where your leg meets your lower abdomen (groin). This creates a bulge. This kind of hernia could also be:  · In your scrotum, if you are male.  · In folds of skin around your vagina, if you are female.  There are three types of inguinal hernias:  · Hernias that can be pushed back into the abdomen (are reducible). This type rarely causes pain.  · Hernias that are not reducible (are incarcerated).  · Hernias that are not reducible and lose their blood supply (are strangulated). This type of hernia requires emergency surgery.  What are the causes?  This condition is caused by having a weak spot in the muscles or tissues in the groin. This weak spot develops over time. The hernia may poke through the weak spot when you suddenly strain your lower abdominal muscles, such as when you:  · Lift a heavy object.  · Strain to have a bowel movement. Constipation can lead to straining.  · Cough.  What increases the risk?  This condition is more likely to develop in:  · Men.  · Pregnant women.  · People who:  ? Are overweight.  ? Work in jobs that require long periods of standing or heavy lifting.  ? Have had an inguinal hernia before.  ? Smoke or have lung disease. These factors can lead to long-lasting (chronic) coughing.  What are the signs or symptoms?  Symptoms may depend on the size of the hernia. Often, a small inguinal hernia has no symptoms. Symptoms of a larger hernia may include:  · A lump in the groin area. This is easier to see when standing. It might not be visible when lying down.  · Pain or burning in the groin. This may get worse when lifting, straining, or coughing.  · A dull ache or a feeling of pressure in the groin.  · In men, an unusual lump in the scrotum.  Symptoms of a strangulated inguinal hernia may include:  · A bulge in your groin that is very painful and tender to the touch.  · A bulge  that turns red or purple.  · Fever, nausea, and vomiting.  · Inability to have a bowel movement or to pass gas.  How is this diagnosed?  This condition is diagnosed based on your symptoms, your medical history, and a physical exam. Your health care provider may feel your groin area and ask you to cough.  How is this treated?  Treatment depends on the size of your hernia and whether you have symptoms. If you do not have symptoms, your health care provider may have you watch your hernia carefully and have you come in for follow-up visits. If your hernia is large or if you have symptoms, you may need surgery to repair the hernia.  Follow these instructions at home:  Lifestyle  · Avoid lifting heavy objects.  · Avoid standing for long periods of time.  · Do not use any products that contain nicotine or tobacco, such as cigarettes and e-cigarettes. If you need help quitting, ask your health care provider.  · Maintain a healthy weight.  Preventing constipation  · Take actions to prevent constipation. Constipation leads to straining with bowel movements, which can make a hernia worse or cause a hernia repair to break down. Your health care provider may recommend that you:  ? Drink enough fluid to keep your urine pale yellow.  ? Eat foods that are high in fiber, such as fresh fruits and vegetables, whole grains, and beans.  ? Limit foods that are high in fat and processed sugars, such as fried or sweet foods.  ? Take an over-the-counter or prescription medicine for constipation.  General instructions  · You may try to push the hernia back in place by very gently pressing on it while lying down. Do not try to force the bulge back in if it will not push in easily.  · Watch your hernia for any changes in shape, size, or color. Get help right away if you notice any changes.  · Take over-the-counter and prescription medicines only as told by your health care provider.  · Keep all follow-up visits as told by your health care  provider. This is important.  Contact a health care provider if:  · You have a fever.  · You develop new symptoms.  · Your symptoms get worse.  Get help right away if:  · You have pain in your groin that suddenly gets worse.  · You have a bulge in your groin that:  ? Suddenly gets bigger and does not get smaller.  ? Becomes red or purple or painful to the touch.  · You are a man and you have a sudden pain in your scrotum, or the size of your scrotum suddenly changes.  · You cannot push the hernia back in place by very gently pressing on it when you are lying down. Do not try to force the bulge back in if it will not push in easily.  · You have nausea or vomiting that does not go away.  · You have a fast heartbeat.  · You cannot have a bowel movement or pass gas.  These symptoms may represent a serious problem that is an emergency. Do not wait to see if the symptoms will go away. Get medical help right away. Call your local emergency services (911 in the [place]  Summary  · An inguinal hernia develops when fat or the intestines push through a weak spot in a muscle where your leg meets your lower abdomen (groin).  · This condition is caused by having a weak spot in muscles or tissue in your groin.  · Symptoms may depend on the size of the hernia, and they may include pain or swelling in your groin. A small inguinal hernia often has no symptoms.  · Treatment may not be needed if you do not have symptoms. If you have symptoms or a large hernia, you may need surgery to repair the hernia.  · Avoid lifting heavy objects. Also avoid standing for long amounts of time.  This information is not intended to replace advice given to you by your health care provider. Make sure you discuss any questions you have with your health care provider.  Document Released: 05/06/2010 Document Revised: 01/19/2019 Document Reviewed: 09/19/2018  Elsevier Patient Education © 2020 [employer]  Cough, Adult  Coughing is a reflex that clears your  throat and your airways (respiratory system). Coughing helps to heal and protect your lungs. It is normal to cough occasionally, but a cough that happens with other symptoms or lasts a long time may be a sign of a condition that needs treatment. An acute cough may only last 2-3 weeks, while a chronic cough may last 8 or more weeks.  Coughing is commonly caused by:  · Infection of the respiratory systemby viruses or bacteria.  · Breathing in substances that irritate your lungs.  · Allergies.  · Asthma.  · Mucus that runs down the back of your throat (postnasal drip).  · Smoking.  · Acid backing up from the stomach into the esophagus (gastroesophageal reflux).  · Certain medicines.  · Chronic lung problems.  · Other medical conditions such as heart failure or a blood clot in the lung (pulmonary embolism).  Follow these instructions at home:  Medicines  · Take over-the-counter and prescription medicines only as told by your health care provider.  · Talk with your health care provider before you take a cough suppressant medicine.  Lifestyle    · Avoid cigarette smoke. Do not use any products that contain nicotine or tobacco, such as cigarettes, e-cigarettes, and chewing tobacco. If you need help quitting, ask your health care provider.  · Drink enough fluid to keep your urine pale yellow.  · Avoid caffeine.  · Do not drink alcohol if your health care provider tells you not to drink.  General instructions    · Pay close attention to changes in your cough. Tell your health care provider about them.  · Always cover your mouth when you cough.  · Avoid things that make you cough, such as perfume, candles, cleaning products, or campfire or tobacco smoke.  · If the air is dry, use a cool mist vaporizer or humidifier in your bedroom or your home to help loosen secretions.  · If your cough is worse at night, try to sleep in a semi-upright position.  · Rest as needed.  · Keep all follow-up visits as told by your health care  provider. This is important.  Contact a health care provider if you:  · Have new symptoms.  · Cough up pus.  · Have a cough that does not get better after 2-3 weeks or gets worse.  · Cannot control your cough with cough suppressant medicines and you are losing sleep.  · Have pain that gets worse or pain that is not helped with medicine.  · Have a fever.  · Have unexplained weight loss.  · Have night sweats.  Get help right away if:  · You cough up blood.  · You have difficulty breathing.  · Your heartbeat is very fast.  These symptoms may represent a serious problem that is an emergency. Do not wait to see if the symptoms will go away. Get medical help right away. Call your local emergency services (911 in the [place] Do not drive yourself to the hospital.  Summary  · Coughing is a reflex that clears your throat and your airways. It is normal to cough occasionally, but a cough that happens with other symptoms or lasts a long time may be a sign of a condition that needs treatment.  · Take over-the-counter and prescription medicines only as told by your health care provider.  · Always cover your mouth when you cough.  · Contact a health care provider if you have new symptoms or a cough that does not get better after 2-3 weeks or gets worse.  This information is not intended to replace advice given to you by your health care provider. Make sure you discuss any questions you have with your health care provider.  Document Released: 06/15/2012 Document Revised: 01/06/2020 Document Reviewed: 01/06/2020  Elsevier Patient Education © 2020 [employer]

## 2022-04-04 NOTE — PROGRESS NOTES
Subjective:     Ulysses Coleman is a 76 y.o. male who presents for Cough (Coughing makes L lymph node sore )      Cough  Associated symptoms include a sore throat. Pertinent negatives include no chills, fever, heartburn, hemoptysis or shortness of breath.     Pt presents for evaluation of a new problem. Ulysses is a pleasant 76 year old male who presents to urgent care today with complaints of a persistent cough for the past few months.  He notes that this cough did start developing after he received his Covid booster.  Recently he has developed a sore throat and slight congestion last week.  Does have pain with swallowing.  He notes that he does not have a productive cough.  His cough is much worse at night.  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  Negative for personal history of smoking.  He has been using DayQuil and NyQuil as needed for relief of his cough and sore throat.  This provides mild relief.  Additionally, he complains of bulging groin when standing.  He is lying down.  He denies any pain of his right groin.  Negative for changes in bowel movements, fever, chills, nausea or vomiting.    Review of Systems   Respiratory: Positive for cough.        PMH: History reviewed. No pertinent past medical history.  ALLERGIES: No Known Allergies  SURGHX: History reviewed. No pertinent surgical history.  SOCHX:   Social History     Socioeconomic History   • Marital status:    Tobacco Use   • Smoking status: Never Smoker   • Smokeless tobacco: Never Used   Vaping Use   • Vaping Use: Never used   Substance and Sexual Activity   • Alcohol use: Yes     Comment: occ   • Drug use: No     FH:   Family History   Problem Relation Age of Onset   • Stroke Neg Hx    • Heart Disease Neg Hx          Objective:   /70   Pulse 63   Temp 36.3 °C (97.3 °F) (Temporal)   Resp 12   Ht 1.829 m (6')   Wt 79.4 kg (175 lb)   SpO2 99%   BMI 23.73 kg/m²      Physical Exam  Vitals and nursing note reviewed.   Constitutional:       General: He is not in acute distress.     Appearance: Normal appearance. He is not ill-appearing.   HENT:      Head: Normocephalic and atraumatic.      Right Ear: External ear normal.      Left Ear: External ear normal.      Nose: No congestion or rhinorrhea.      Mouth/Throat:      Mouth: Mucous membranes are moist.      Pharynx: Uvula midline. Posterior oropharyngeal erythema present. No pharyngeal swelling, oropharyngeal exudate or uvula swelling.      Tonsils: No tonsillar exudate. 1+ on the right. 1+ on the left.   Eyes:      Extraocular Movements: Extraocular movements intact.      Pupils: Pupils are equal, round, and reactive to light.   Cardiovascular:      Rate and Rhythm: Normal rate and regular rhythm.      Pulses: Normal pulses.      Heart sounds: Normal heart sounds.   Pulmonary:      Effort: Pulmonary effort is normal. No respiratory distress.      Breath sounds: Normal breath sounds. No stridor. No wheezing, rhonchi or rales.   Chest:      Chest wall: No tenderness.   Abdominal:      General: Abdomen is flat. Bowel sounds are normal.      Palpations: Abdomen is soft.      Tenderness: There is no abdominal tenderness. There is no right CVA tenderness, left CVA tenderness or guarding.      Hernia: A hernia is present. Hernia is present in the right inguinal area.   Musculoskeletal:         General: Normal range of motion.      Cervical back: Normal range of motion and neck supple.   Skin:     General: Skin is warm and dry.      Capillary Refill: Capillary refill takes less than 2 seconds.   Neurological:      General: No focal deficit present.      Mental Status: He is alert and oriented to person, place, and time. Mental status is at baseline.   Psychiatric:         Mood and Affect: Mood normal.         Behavior: Behavior normal.         Thought Content: Thought content normal.         Judgment: Judgment normal.       DX  chest:  Assessment/Plan:   Assessment    1. Persistent cough for 3 weeks or longer  promethazine-dextromethorphan (PROMETHAZINE-DM) 6.25-15 MG/5ML syrup    DX-CHEST-2 VIEWS    doxycycline (VIBRAMYCIN) 100 MG Tab    Referral to establish with [facility]CP   2. Bulge in groin area  US-INGUINAL HERNIA    Referral to establish with [facility] PCP   3. Health care maintenance  Referral to establish with [facility] PCP     X-ray results discussed with patient.  X-ray was ordered due to his persistent cough for the past 3 months.  We will empirically treat for acute infection as this is been persistant and he is also experiencing a sore throat and congestion. Differentials of persistent cough include silent GERDU/S ordered for confirmation of right inguinal hernia. Pt to follow up with PCP for health care maintenance.   AVS handout given and reviewed with patient. Pt educated on red flags and when to seek treatment back in ER or UC.

## 2022-11-04 ENCOUNTER — HOSPITAL ENCOUNTER (OUTPATIENT)
Dept: RADIOLOGY | Facility: MEDICAL CENTER | Age: 77
End: 2022-11-04
Attending: NURSE PRACTITIONER
Payer: COMMERCIAL

## 2022-11-04 ENCOUNTER — OFFICE VISIT (OUTPATIENT)
Dept: URGENT CARE | Facility: PHYSICIAN GROUP | Age: 77
End: 2022-11-04
Payer: COMMERCIAL

## 2022-11-04 VITALS
HEART RATE: 53 BPM | HEIGHT: 72 IN | WEIGHT: 162 LBS | TEMPERATURE: 96.5 F | DIASTOLIC BLOOD PRESSURE: 80 MMHG | BODY MASS INDEX: 21.94 KG/M2 | RESPIRATION RATE: 16 BRPM | SYSTOLIC BLOOD PRESSURE: 120 MMHG | OXYGEN SATURATION: 97 %

## 2022-11-04 DIAGNOSIS — M19.019 SHOULDER ARTHRITIS: ICD-10-CM

## 2022-11-04 DIAGNOSIS — M75.31 CALCIFIC TENDINITIS OF RIGHT SHOULDER: ICD-10-CM

## 2022-11-04 DIAGNOSIS — M25.511 ACUTE PAIN OF RIGHT SHOULDER: ICD-10-CM

## 2022-11-04 PROCEDURE — 73030 X-RAY EXAM OF SHOULDER: CPT | Mod: RT

## 2022-11-04 PROCEDURE — 99214 OFFICE O/P EST MOD 30 MIN: CPT | Performed by: NURSE PRACTITIONER

## 2022-11-04 RX ORDER — NAPROXEN 500 MG/1
500 TABLET ORAL EVERY 12 HOURS PRN
Qty: 30 TABLET | Refills: 0 | Status: SHIPPED
Start: 2022-11-04 | End: 2023-01-25

## 2022-11-04 ASSESSMENT — ENCOUNTER SYMPTOMS: CONSTITUTIONAL NEGATIVE: 1

## 2022-11-04 ASSESSMENT — VISUAL ACUITY: OU: 1

## 2022-11-04 NOTE — PROGRESS NOTES
Subjective:     Ulysses Coleman is a 76 y.o. male who presents for Shoulder Pain (X4 weeks, right shoulder pain )       Shoulder Pain  This is a new problem. The problem has been gradually worsening.     Patient complaining of 4 weeks of right shoulder pain.  Pain worsens at nighttime.  Mildly decreased ROM.  Nontender.    Denies injury.  Right-hand-dominant.  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   Prior therapy: None.    Review of Systems   Constitutional: Negative.    Musculoskeletal:         Right shoulder pain   All other systems reviewed and are negative.    Refer to HPI for additional details.    During this visit, appropriate PPE was worn, hand hygiene was performed, and the patient and any visitors were masked.    PMH:  has no past medical history of Asthma, Cancer (HCC), Diabetes (HCC), Hyperlipidemia, or Hypertension.    MEDS:   Current Outpatient Medications:     naproxen (NAPROSYN) 500 MG Tab, Take 1 Tablet by mouth every 12 hours as needed (Pain/inflammation)., Disp: 30 Tablet, Rfl: 0    ALLERGIES: No Known Allergies  SURGHX: History reviewed. No pertinent surgical history.  SOCHX:  reports that he has never smoked. He has never used smokeless tobacco. He reports current alcohol use. He reports that he does not use drugs.    FH: Per [facility] as applicable/pertinent.      Objective:     /80   Pulse (!) 53   Temp 35.8 °C (96.5 °F) (Temporal)   Resp 16   Ht 1.829 m (6')   Wt 73.5 kg (162 lb)   SpO2 97%   BMI 21.97 kg/m²     Physical Exam  Nursing note reviewed.   Constitutional:       General: He is not in acute distress.     Appearance: He is well-developed. He is not ill-appearing or toxic-appearing.   Eyes:      General: Vision grossly intact.   Cardiovascular:      Rate and Rhythm: Normal rate.   Pulmonary:      Effort: Pulmonary effort is normal. No respiratory distress.   Musculoskeletal:         General: No deformity.      Right shoulder: No swelling, deformity or tenderness.  Decreased range of motion (Mild).   Skin:     Coloration: Skin is not pale.   Neurological:      Mental Status: He is alert and oriented to person, place, and time.      Motor: No weakness.   Psychiatric:         Behavior: Behavior normal. Behavior is cooperative.     X-ray of right shoulder:    Details    Reading Physician Reading Date Result Priority   Charles Ghotra M.D.  [phone] 11/4/2022 Urgent Care     Narrative & Impression     11/4/2022 4:54 PM     HISTORY/REASON FOR EXAM:  Atraumatic Pain/Swelling/Deformity  Right shoulder pain     TECHNIQUE/EXAM DESCRIPTION AND NUMBER OF VIEWS:  3 views of the RIGHT shoulder.     COMPARISON: None     FINDINGS:     No acute fracture or dislocation.  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  Ill-defined calcification in the articular surface of the rotator cuff.     IMPRESSION:    1. Ill-defined calcification in the articular surface of the rotator cuff could be early calcific tendinitis or dystrophic calcification from prior trauma.           Exam Ended: 11/04/22  5:06 PM Last Resulted: 11/04/22  5:09 PM           Radiology report and images reviewed by myself. Concur with findings.      Assessment/Plan:     1. Acute pain of right shoulder  - DX-SHOULDER 2+ RIGHT; Future  - naproxen (NAPROSYN) 500 MG Tab; Take 1 Tablet by mouth every 12 hours as needed (Pain/inflammation).  Dispense: 30 Tablet; Refill: 0  - Referral to Sports Medicine    2. Calcific tendinitis of right shoulder  - naproxen (NAPROSYN) 500 MG Tab; Take 1 Tablet by mouth every 12 hours as needed (Pain/inflammation).  Dispense: 30 Tablet; Refill: 0  - Referral to Sports Medicine    3. Shoulder arthritis  - naproxen (NAPROSYN) 500 MG Tab; Take 1 Tablet by mouth every 12 hours as needed (Pain/inflammation).  Dispense: 30 Tablet; Refill: 0  - Referral to Sports Medicine    Rx as above sent electronically. Rest, ice, elevate. OTC APAP PRN. Follow up with sports  medicine.    Differential diagnosis, natural history, supportive care, over-the-counter symptom management per 's instructions, close monitoring, and indications for immediate follow-up discussed.     All questions answered. Patient agrees with the plan of care.    Discharge summary provided.    Patient declines work note.    Billing note: established patient with moderate complexity and moderate risk. [id number]. Please refer to LOS tool for details.

## 2022-11-05 NOTE — PATIENT INSTRUCTIONS
Details    Reading Physician Reading Date Result Priority   Charles Ghotra M.D.  [phone] 11/4/2022 Urgent Care     Narrative & Impression     11/4/2022 4:54 PM     HISTORY/REASON FOR EXAM:  Atraumatic Pain/Swelling/Deformity  Right shoulder pain     TECHNIQUE/EXAM DESCRIPTION AND NUMBER OF VIEWS:  3 views of the RIGHT shoulder.     COMPARISON: None     FINDINGS:     No acute fracture or dislocation.  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  Ill-defined calcification in the articular surface of the rotator cuff.     IMPRESSION:        1. Ill-defined calcification in the articular surface of the rotator cuff could be early calcific tendinitis or dystrophic calcification from prior trauma.           Exam Ended: 11/04/22  5:06 PM Last Resulted: 11/04/22  5:09 PM

## 2022-11-28 ENCOUNTER — OFFICE VISIT (OUTPATIENT)
Dept: URGENT CARE | Facility: PHYSICIAN GROUP | Age: 77
End: 2022-11-28
Payer: COMMERCIAL

## 2022-11-28 VITALS
OXYGEN SATURATION: 100 % | HEIGHT: 72 IN | RESPIRATION RATE: 16 BRPM | DIASTOLIC BLOOD PRESSURE: 74 MMHG | TEMPERATURE: 97.3 F | BODY MASS INDEX: 22.62 KG/M2 | SYSTOLIC BLOOD PRESSURE: 122 MMHG | WEIGHT: 167 LBS | HEART RATE: 61 BPM

## 2022-11-28 DIAGNOSIS — M75.31 CALCIFIC TENDINITIS OF RIGHT SHOULDER: ICD-10-CM

## 2022-11-28 PROCEDURE — 99213 OFFICE O/P EST LOW 20 MIN: CPT | Performed by: PHYSICIAN ASSISTANT

## 2022-11-28 ASSESSMENT — ENCOUNTER SYMPTOMS
SHORTNESS OF BREATH: 0
EYE DISCHARGE: 0
EYE REDNESS: 0

## 2022-11-29 ASSESSMENT — ENCOUNTER SYMPTOMS
COUGH: 0
VOMITING: 0
FEVER: 0
NAUSEA: 0

## 2022-11-29 NOTE — PROGRESS NOTES
"Subjective Ulysses Coleman is a 77 y.o. male who presents with Shoulder Pain (Right shoulder , no improvement .)          This is a recurrent problem.   The patient presents to clinic complaining of continued pain to his right shoulder.  The patient was previously seen in clinic on 11/4/2022 for his right shoulder pain.  The patient states he states been experiencing pain to his right shoulder for several months.  The patient states his pain is worse at night.  The patient describes the pain as \"stiffness\".  The patient reports no recent injury or trauma to his right shoulder.  He also reports no associated swelling, bruising, or redness.  He also reports no radiation of pain to the right upper extremity.  The the patient notes some intermittent tingling of the right upper extremity, which only occurs at night.  He reports no numbness or weakness.  He also reports no chest pain or shortness of breath.  The patient has taken OTC Advil for his current symptoms.    Shoulder Pain  This is a recurrent problem. Episode onset: x several months. The problem has been unchanged. Pertinent negatives include no chest pain, congestion, coughing, fever, nausea or vomiting.     PMH:  has no past medical history of Asthma, Cancer (HCC), Diabetes ([facility]), Hyperlipidemia, or Hypertension.  MEDS:   Current Outpatient Medications:     naproxen (NAPROSYN) 500 MG Tab, Take 1 Tablet by mouth every 12 hours as needed (Pain/inflammation)., Disp: 30 Tablet, Rfl: 0  ALLERGIES: No Known Allergies  SURGHX: No past surgical history on file.  SOCHX:  reports that he has never smoked. He has never used smokeless tobacco. He reports current alcohol use. He reports that he does not use drugs.  FH: Family history was reviewed, no pertinent findings to report      Review of Systems   Constitutional:  Negative for fever.   HENT:  Negative for congestion.    Eyes:  Negative for discharge and redness.   Respiratory:  Negative for cough and shortness of " breath.    Cardiovascular:  Negative for chest pain.   Gastrointestinal:  Negative for nausea and vomiting.   Musculoskeletal:  Positive for joint pain.   All other systems reviewed and are negative.         Objective   /74 (BP Location: Left arm, Patient Position: Sitting, BP Cuff Size: Adult)   Pulse 61   Temp 36.3 °C (97.3 °F) (Temporal)   Resp 16   Ht 1.829 m (6')   Wt 75.8 kg (167 lb)   SpO2 100%   BMI 22.65 kg/m²      Physical Exam  Constitutional:       General: He is not in acute distress.     Appearance: Normal appearance. He is well-developed. He is not ill-appearing.   HENT:      Head: Normocephalic and atraumatic.      Right Ear: External ear normal.      Left Ear: External ear normal.   Eyes:      Extraocular Movements: Extraocular movements intact.      Conjunctiva/sclera: Conjunctivae normal.   Cardiovascular:      Rate and Rhythm: Normal rate.   Pulmonary:      Effort: Pulmonary effort is normal.   Musculoskeletal:      Cervical back: Normal range of motion and neck supple.      Comments:   Right Shoulder:  Mild tenderness to the anterior aspect of the right shoulder.  No tenderness overlying the clavicle.  No tenderness overlying the posterior aspect of the right shoulder.  No tenderness to the proximal right humerus.  No bony tenderness.  No edema.  No ecchymosis.  No overlying erythema.  No increased warmth.  ROM intact -the patient demonstrates full active range of motion of the right shoulder  Neurovascular intact distally  Radial pulse 2+  Strength 5/5 -equal bilateral upper extremities   strength 5/5  Intrinsic muscles intact   Skin:     General: Skin is warm and dry.   Neurological:      Mental Status: He is alert and oriented to person, place, and time.          Progress:  Reviewed the patient's previous visit on 11/04/2022, including the results of his right shoulder x-ray.             Assessment & Plan        1. Calcific tendinitis of right shoulder    The patient presents  to clinic complaining of continued right shoulder pain.  The patient was previously seen in clinic on 11/4/2022.  The patient's previous right shoulder x-ray showed an ill-defined calcification in the articular surface of the rotator cuff, which could be early calcific tendinitis or dystrophic calcification from prior trauma.  I suspect this to be the cause of the patient's ongoing right shoulder pain.  Informed the patient he will need to follow-up with a specialist regarding his current symptoms.  The patient was previously referred to sports medicine at his prior visit.  Provided the patient with the contact information for sports medicine.  Recommend OTC medications and supportive care for symptomatic management.  Recommend patient follow-up with his PCP as needed.  Discussed return precautions with the patient, and he verbalized understanding.    Differential diagnoses, supportive care, and indications for immediate follow-up discussed with patient.   Instructed to return to clinic or nearest emergency department for any change in condition, further concerns, or worsening of symptoms.    OTC NSAIDs for pain/discomfort   Apply ice and or heat to the affected area for symptomatic relief  Follow-up with sports medicine  -- Provided the patient with the contact information for the sports medicine clinic  Wear brace for additional support  Weight-bearing as tolerated  Follow-up with PCP   Return to clinic or go tot he ED if symptoms worsen or fail to improve, or if the patient should develop worsening/increasing pain/tenderness, swelling, bruising, redness or warmth to the affected area, decreased ROM, numbness, tingling or weakness, fever/chills, and/or any concerning symptoms.     Discussed plan with the patient, and he agrees to the above.    I personally reviewed prior external notes and test results pertinent to today's visit.  I have independently reviewed and interpreted all diagnostics ordered during this  urgent care visit.     Please note that this dictation was created using voice recognition software. I have made every reasonable attempt to correct obvious errors, but I expect that there may be errors of grammar and possibly content that I did not discover before finalizing the note.     This note was electronically signed by Geetha Posey PA-C

## 2022-12-01 ENCOUNTER — OFFICE VISIT (OUTPATIENT)
Dept: SPORTS MEDICINE | Facility: CLINIC | Age: 77
End: 2022-12-01
Payer: COMMERCIAL

## 2022-12-01 VITALS
RESPIRATION RATE: 16 BRPM | HEART RATE: 65 BPM | TEMPERATURE: 97.9 F | SYSTOLIC BLOOD PRESSURE: 146 MMHG | HEIGHT: 72 IN | DIASTOLIC BLOOD PRESSURE: 90 MMHG | WEIGHT: 167 LBS | BODY MASS INDEX: 22.62 KG/M2 | OXYGEN SATURATION: 98 %

## 2022-12-01 DIAGNOSIS — K40.90 INGUINAL HERNIA, RIGHT: ICD-10-CM

## 2022-12-01 DIAGNOSIS — M75.31 CALCIFIC TENDINITIS OF RIGHT SHOULDER: ICD-10-CM

## 2022-12-01 PROCEDURE — 20610 DRAIN/INJ JOINT/BURSA W/O US: CPT | Mod: RT | Performed by: FAMILY MEDICINE

## 2022-12-01 PROCEDURE — 99213 OFFICE O/P EST LOW 20 MIN: CPT | Mod: 25 | Performed by: FAMILY MEDICINE

## 2022-12-01 RX ORDER — TRIAMCINOLONE ACETONIDE 40 MG/ML
40 INJECTION, SUSPENSION INTRA-ARTICULAR; INTRAMUSCULAR ONCE
Status: COMPLETED | OUTPATIENT
Start: 2022-12-01 | End: 2022-12-01

## 2022-12-01 RX ADMIN — TRIAMCINOLONE ACETONIDE 40 MG: 40 INJECTION, SUSPENSION INTRA-ARTICULAR; INTRAMUSCULAR at 12:09

## 2022-12-01 NOTE — PROGRESS NOTES
Chief Complaint   Patient presents with    Shoulder Pain     Referral from / R shoulder pain      CHIEF COMPLAINT:  Ulysses Coleman male presenting at the request of MONET Hinton  for evaluation of Shoulder pain.     Ulysses Coleman is complaining of right shoulder pain  present since early October 2022  Insidious onset without injury  Pain is at the deltoid region  Quality is aching  Pain is Non-radiating, mostly in a rotator cuff distribution  Aggravated by movement and particularly with sleeping on the RIGHT side  Improved with  movement over time warming up the shoulder can be helpful  no prior problems with this shoulder in the past  Prior Treatments:  Seen at urgent care  Prior studies: X-Ray   Medications tried for pain include: naproxen (OTC) did NOT help  Mechanical Symptom history: No Locking and Popping which is not necessarily painful     at [employer]  [employer], yard work    REVIEW OF SYSTEMS  No Nausea, No Vomiting, No Chest Pain, No Shortness of Breath, No Dizziness, No Headache    PAST MEDICAL HISTORY:   History reviewed. No pertinent past medical history.    PMH:  has no past medical history of Asthma, Cancer (HCC), Diabetes (HCC), Hyperlipidemia, or Hypertension.  MEDS:   Current Outpatient Medications:     naproxen (NAPROSYN) 500 MG Tab, Take 1 Tablet by mouth every 12 hours as needed (Pain/inflammation)., Disp: 30 Tablet, Rfl: 0  ALLERGIES: No Known Allergies  SURGHX: No past surgical history on file.  SOCHX:  reports that he has never smoked. He has never used smokeless tobacco. He reports current alcohol use. He reports that he does not use drugs.  FH: Family history was reviewed, no pertinent findings to report     PHYSICAL EXAM:  BP (!) 146/90 (BP Location: Left arm, Patient Position: Sitting, BP Cuff Size: Adult)   Pulse 65   Temp 36.6 °C (97.9 °F) (Temporal)   Resp 16   Ht 1.829 m (6')   Wt 75.8 kg (167 lb)   SpO2 98%   BMI 22.65 kg/m²      well-developed,  well-nourished in no apparent distress, alert and oriented x 3.  Gait: normal    Cervical spine:  Range of motion Markedly limited with Extension  Spurling's testing is NEGATIVE  Cervical spine tenderness NEGATIVE    Strength testing:     Deltoid, bilateral 5/5  Bicep, bilateral 5/5  Tricep, bilateral 5/5  Wrist Extension, bilateral 5/5  Wrist Flexion, bilateral 5/5  Finger Abduction, bilateral 5/5    Sensation:  INTACT Bilaterally        Reflexes:   Biceps: R 2+/L 2+  Triceps: R 2+/L  2+  Brachial radialis R 2+/L  2+  Alan's testing is NEGATIVE  The arms are otherwise neurovascularly intact     Shoulder Exam:    RIGHT Shoulder:  No visible swelling   Range of motion DIMINISHED with pain  Tenderness: Supraspinatous tenderness  Empty Can Testing 5/5  Internal Rotation 5/5  External Rotation 4/5  Lift Off Testing 5/5  Impingement testing Ochoa  NEGATIVE  Neer's testing NEGATIVE  Apprehension testing NEGATIVE    LEFT Shoulder:  No visible swelling   Range of motion INTACT  Tenderness: Non-tender  Empty Can Testing 5/5  Internal Rotation 5/5  External Rotation 5/5  Lift Off Testing 5/5  Impingement testing Ochoa  NEGATIVE  Neer's testing NEGATIVE  Apprehension testing NEGATIVE    Additional Findings: None      1. Calcific tendinitis of right shoulder  triamcinolone acetonide (KENALOG-40) injection 40 mg      2. Inguinal hernia, right  Referral to General Surgery        present since early October 2022  Insidious onset without injury  Pain is at the deltoid region    RIGHT subacromial corticosteroid injection performed the office TODAY (December 1, 2022)    Patient was provided with a shoulder exercise program  Offered formal physical therapy, but he politely declined at this time    Return in about 4 weeks (around 12/29/2022).  To see how he is doing after his RIGHT subacromial corticosteroid injection       Referral to general surgery for hernia      11/4/2022 4:54 PM     HISTORY/REASON FOR EXAM:  Atraumatic  Pain/Swelling/Deformity  Right shoulder pain     TECHNIQUE/EXAM DESCRIPTION AND NUMBER OF VIEWS:  3 views of the RIGHT shoulder.     COMPARISON: None     FINDINGS:     No acute fracture or dislocation.  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  Ill-defined calcification in the articular surface of the rotator cuff.     IMPRESSION:        1. Ill-defined calcification in the articular surface of the rotator cuff could be early calcific tendinitis or dystrophic calcification from prior trauma.           Exam Ended: 11/04/22  5:06 PM Last Resulted: 11/04/22  5:09 PM           done elsewhere and reviewed independently by me    Thank you TELLO Hinton.P.R.N. for allowing me to participate in caring for your patient.

## 2022-12-01 NOTE — Clinical Note
Marcus Nash and Geetha, Thank you for sending Ulysses to the sports medicine clinic for shoulder pain. We provided him with a corticosteroid injection the RIGHT shoulder at today's visit. We will see him back in a month to see how things are coming along. Hope you are both doing well and have an amazing holiday season!  Respectfully,  MERLY Banegas M.D. [facility] [phone]

## 2023-01-09 ENCOUNTER — OFFICE VISIT (OUTPATIENT)
Dept: SPORTS MEDICINE | Facility: CLINIC | Age: 78
End: 2023-01-09
Payer: COMMERCIAL

## 2023-01-09 ENCOUNTER — APPOINTMENT (OUTPATIENT)
Dept: RADIOLOGY | Facility: IMAGING CENTER | Age: 78
End: 2023-01-09
Attending: FAMILY MEDICINE
Payer: COMMERCIAL

## 2023-01-09 VITALS
HEART RATE: 79 BPM | SYSTOLIC BLOOD PRESSURE: 120 MMHG | OXYGEN SATURATION: 98 % | WEIGHT: 167 LBS | TEMPERATURE: 97.6 F | RESPIRATION RATE: 16 BRPM | BODY MASS INDEX: 22.62 KG/M2 | DIASTOLIC BLOOD PRESSURE: 76 MMHG | HEIGHT: 72 IN

## 2023-01-09 DIAGNOSIS — M75.31 CALCIFIC TENDINITIS OF RIGHT SHOULDER: ICD-10-CM

## 2023-01-09 DIAGNOSIS — M19.011 OSTEOARTHRITIS, LOCALIZED, SHOULDER, RIGHT: ICD-10-CM

## 2023-01-09 DIAGNOSIS — M25.512 ACUTE PAIN OF LEFT SHOULDER: ICD-10-CM

## 2023-01-09 PROCEDURE — 99214 OFFICE O/P EST MOD 30 MIN: CPT | Mod: 25 | Performed by: FAMILY MEDICINE

## 2023-01-09 PROCEDURE — 20610 DRAIN/INJ JOINT/BURSA W/O US: CPT | Mod: RT | Performed by: FAMILY MEDICINE

## 2023-01-09 PROCEDURE — 73030 X-RAY EXAM OF SHOULDER: CPT | Mod: TC,LT | Performed by: FAMILY MEDICINE

## 2023-01-09 RX ORDER — TRIAMCINOLONE ACETONIDE 40 MG/ML
40 INJECTION, SUSPENSION INTRA-ARTICULAR; INTRAMUSCULAR ONCE
Status: COMPLETED | OUTPATIENT
Start: 2023-01-09 | End: 2023-01-09

## 2023-01-09 RX ADMIN — TRIAMCINOLONE ACETONIDE 40 MG: 40 INJECTION, SUSPENSION INTRA-ARTICULAR; INTRAMUSCULAR at 12:37

## 2023-01-09 NOTE — PROGRESS NOTES
Chief Complaint   Patient presents with    Shoulder Pain     F/V R shoulder pain        CHIEF COMPLAINT:  Ulysses Coleman male presenting at the request of MONET Hinton  for evaluation of Shoulder pain.     Ulysses Coleman is here for FOLLOW Up for His Right shoulder pain  present since early October 2022  Insidious onset without injury  Pain is at the deltoid region  Quality is aching  Pain is Non-radiating, mostly in a rotator cuff distribution  Aggravated by movement and particularly with sleeping on the RIGHT side  Improved with  movement over time warming up the shoulder can be helpful  no prior problems with this shoulder in the past    Subacromial corticosteroid injection performed December 1, 2022 of the RIGHT shoulder did NOT help much    Now he is complaining of RIGHT shoulder pain as well     at [employer], yard work     PHYSICAL EXAM:  /76 (BP Location: Left arm, Patient Position: Sitting, BP Cuff Size: Adult)   Pulse 79   Temp 36.4 °C (97.6 °F) (Temporal)   Resp 16   Ht 1.829 m (6')   Wt 75.8 kg (167 lb)   SpO2 98%   BMI 22.65 kg/m²      well-developed, well-nourished in no apparent distress, alert and oriented x 3.  Gait: normal    Shoulder Exam:    RIGHT Shoulder:  No visible swelling   Range of motion DIMINISHED with pain  Tenderness: Supraspinatous tenderness  Empty Can Testing 5/5  Internal Rotation 5/5  External Rotation 4/5  Lift Off Testing 5/5  Impingement testing Ochoa  NEGATIVE  Neer's testing NEGATIVE  Apprehension testing NEGATIVE    LEFT Shoulder:  No visible swelling   Range of motion INTACT  Tenderness: Non-tender  Empty Can Testing 5/5  Internal Rotation 5/5  External Rotation 5/5  Lift Off Testing 5/5  Impingement testing Ochoa  NEGATIVE  Neer's testing NEGATIVE  Apprehension testing NEGATIVE    Additional Findings: None    1. Osteoarthritis, localized, shoulder, right  triamcinolone acetonide (KENALOG-40) injection 40 mg      2. Acute  pain of left shoulder  DX-SHOULDER 2+ LEFT      3. Calcific tendinitis of right shoulder          present since early October 2022  Insidious onset without injury  Pain is at the deltoid region    RIGHT subacromial corticosteroid injection performed (December 1, 2022) but NOT help much    RIGHT GLENOHUMERAL corticosteroid injection performed the office TODAY (December 9, 2022)    New problem LEFT shoulder pain  No specific injury  Imaging of the LEFT shoulder demonstrates some glenohumeral degeneration    Patient was provided with a shoulder exercise program  Offered formal physical therapy, but he politely declined at this time    Return in about 4 weeks (around 2/6/2023).  To see how he is doing after his RIGHT GLENOHUMERAL corticosteroid injection  IF he is better in his left shoulder pain persists we may consider glenohumeral joint injection of the LEFT shoulder as well at that point        1/9/2023 11:35 AM     HISTORY/REASON FOR EXAM:  Atraumatic Pain/Swelling/Deformity.        TECHNIQUE/EXAM DESCRIPTION AND NUMBER OF VIEWS:  3 views of the LEFT shoulder.     COMPARISON: None     FINDINGS:  Bone mineralization is age appropriate. Bony alignment is anatomic. There is no evidence of acute fracture or dislocation. There are mild-to-moderate degenerative changes of the left acromioclavicular and glenohumeral joints with joint space narrowing   and osteophytic spurring. Visualized portions of the chest are unremarkable.     IMPRESSION:     Degenerative changes of the left glenohumeral and acromioclavicular joints without acute osseous abnormality.           Exam Ended: 01/09/23 11:45 AM Last Resulted: 01/09/23 11:48 AM              Referral to general surgery for hernia      11/4/2022 4:54 PM     HISTORY/REASON FOR EXAM:  Atraumatic Pain/Swelling/Deformity  Right shoulder pain     TECHNIQUE/EXAM DESCRIPTION AND NUMBER OF VIEWS:  3 views of the RIGHT shoulder.     COMPARISON: None     FINDINGS:     No acute fracture  or dislocation.  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  Ill-defined calcification in the articular surface of the rotator cuff.     IMPRESSION:        1. Ill-defined calcification in the articular surface of the rotator cuff could be early calcific tendinitis or dystrophic calcification from prior trauma.           Exam Ended: 11/04/22  5:06 PM Last Resulted: 11/04/22  5:09 PM           done elsewhere and reviewed independently by me    Thank you Keron Johnson, A.P.R.N. for allowing me to participate in caring for your patient.

## 2023-01-09 NOTE — PROCEDURES
PROCEDURE NOTE:  right Shoulder Intra-articular injection  Risks and benefits discussed  Informed consent obtained  Shoulder prepped in sterile fashion utilizing a posterior approach  40 mg of Kenalog and 5 cc of 2% lidocaine injected into the INTRA-articular/glenohumeral space  Vapocoolant spray was utilized  Patient tolerated the procedure well  Postprocedure care and red flags discussed

## 2023-01-25 ENCOUNTER — OFFICE VISIT (OUTPATIENT)
Dept: URGENT CARE | Facility: PHYSICIAN GROUP | Age: 78
End: 2023-01-25
Payer: COMMERCIAL

## 2023-01-25 VITALS
HEIGHT: 72 IN | OXYGEN SATURATION: 95 % | HEART RATE: 79 BPM | WEIGHT: 170 LBS | TEMPERATURE: 97.7 F | RESPIRATION RATE: 18 BRPM | BODY MASS INDEX: 23.03 KG/M2 | SYSTOLIC BLOOD PRESSURE: 118 MMHG | DIASTOLIC BLOOD PRESSURE: 78 MMHG

## 2023-01-25 DIAGNOSIS — R21 RASH: ICD-10-CM

## 2023-01-25 PROCEDURE — 99213 OFFICE O/P EST LOW 20 MIN: CPT | Performed by: FAMILY MEDICINE

## 2023-01-25 RX ORDER — VALACYCLOVIR HYDROCHLORIDE 1 G/1
1000 TABLET, FILM COATED ORAL 2 TIMES DAILY
Qty: 14 TABLET | Refills: 0 | Status: SHIPPED | OUTPATIENT
Start: 2023-01-25 | End: 2023-02-01

## 2023-01-25 NOTE — LETTER
January 25, 2023         Patient: Ulysses Coleman   YOB: 1945   Date of Visit: 1/25/2023           To Whom it May Concern:    Ulysses Coleman was seen in my clinic on 1/25/2023. Please excuse from work 1/24 through 1/27/2023. He may then return to work 1/28/2023.       Sincerely,           Rohit Montana M.D.  Electronically Signed

## 2023-01-31 ASSESSMENT — ENCOUNTER SYMPTOMS
EYE DISCHARGE: 0
MYALGIAS: 0
EYE REDNESS: 0
WEIGHT LOSS: 0
NAUSEA: 0
VOMITING: 0

## 2023-01-31 NOTE — PROGRESS NOTES
Subjective     Ulysses Coleman is a 77 y.o. male who presents with Rash (Rash on nose, sinus congestion x 1 week )            5 days suspected herpes infection upper lip and nose.  He has had new vesicles within the last few days.  Painful.  No drainage.  No eye involvement.  No oral involvement.  No scalp involvement.  No other aggravating alleviating factors.      Review of Systems   Constitutional:  Negative for malaise/fatigue and weight loss.   Eyes:  Negative for discharge and redness.   Gastrointestinal:  Negative for nausea and vomiting.   Musculoskeletal:  Negative for joint pain and myalgias.   Skin:  Negative for itching.            Objective     /78   Pulse 79   Temp 36.5 °C (97.7 °F) (Temporal)   Resp 18   Ht 1.829 m (6')   Wt 77.1 kg (170 lb)   SpO2 95%   BMI 23.06 kg/m²      Physical Exam  Constitutional:       General: He is not in acute distress.     Appearance: He is well-developed.   HENT:      Head: Normocephalic and atraumatic.      Mouth/Throat:      Mouth: Mucous membranes are moist.      Pharynx: No posterior oropharyngeal erythema.   Eyes:      Conjunctiva/sclera: Conjunctivae normal.   Cardiovascular:      Rate and Rhythm: Normal rate and regular rhythm.      Heart sounds: Normal heart sounds. No murmur heard.  Pulmonary:      Effort: Pulmonary effort is normal.      Breath sounds: Normal breath sounds. No wheezing.   Skin:     General: Skin is warm and dry.      Findings: Rash (Upper lip and nose vesicular rash.  No expanding redness.  No fluctuance.  No active drainage.) present.   Neurological:      Mental Status: He is alert.                           Assessment & Plan        1. Rash    - valacyclovir (VALTREX) 1 GM Tab; Take 1 Tablet by mouth 2 times a day for 7 days.  Dispense: 14 Tablet; Refill: 0  - mupirocin (BACTROBAN) 2 % Ointment; Apply to affected area twice daily for 7 days  Dispense: 15 g; Refill: 0     Differential diagnosis, natural history, supportive care,  and indications for immediate follow-up were discussed. ]    Suspect herpetic however distribution is unusual.  We will try Valtrex as well as topical mupirocin.  Follow-up as needed.

## 2023-02-21 ENCOUNTER — OFFICE VISIT (OUTPATIENT)
Dept: URGENT CARE | Facility: PHYSICIAN GROUP | Age: 78
End: 2023-02-21
Payer: COMMERCIAL

## 2023-02-21 VITALS
TEMPERATURE: 97.8 F | HEIGHT: 72 IN | HEART RATE: 74 BPM | OXYGEN SATURATION: 99 % | DIASTOLIC BLOOD PRESSURE: 70 MMHG | SYSTOLIC BLOOD PRESSURE: 124 MMHG | BODY MASS INDEX: 23.16 KG/M2 | RESPIRATION RATE: 16 BRPM | WEIGHT: 171 LBS

## 2023-02-21 DIAGNOSIS — J02.9 PHARYNGITIS, UNSPECIFIED ETIOLOGY: ICD-10-CM

## 2023-02-21 DIAGNOSIS — R09.82 PND (POST-NASAL DRIP): ICD-10-CM

## 2023-02-21 LAB
INT CON NEG: NEGATIVE
INT CON POS: NEGATIVE
S PYO AG THROAT QL: NEGATIVE

## 2023-02-21 PROCEDURE — 99213 OFFICE O/P EST LOW 20 MIN: CPT | Performed by: PHYSICIAN ASSISTANT

## 2023-02-21 PROCEDURE — 87880 STREP A ASSAY W/OPTIC: CPT | Performed by: PHYSICIAN ASSISTANT

## 2023-02-21 RX ORDER — FLUTICASONE PROPIONATE 50 MCG
2 SPRAY, SUSPENSION (ML) NASAL DAILY
Qty: 16 G | Refills: 0 | Status: SHIPPED
Start: 2023-02-21 | End: 2023-04-18

## 2023-02-21 RX ORDER — LIDOCAINE HYDROCHLORIDE 20 MG/ML
5 SOLUTION OROPHARYNGEAL
Qty: 100 ML | Refills: 0 | Status: SHIPPED | OUTPATIENT
Start: 2023-02-21 | End: 2023-02-26

## 2023-02-21 ASSESSMENT — ENCOUNTER SYMPTOMS
COUGH: 0
MYALGIAS: 0
SORE THROAT: 1
DIAPHORESIS: 0
WHEEZING: 0
SPUTUM PRODUCTION: 0
SHORTNESS OF BREATH: 0
DIARRHEA: 0
NAUSEA: 0
VOMITING: 0
SINUS PAIN: 0
FEVER: 0
CHILLS: 0
ABDOMINAL PAIN: 0
DIZZINESS: 0
PALPITATIONS: 0
HEADACHES: 0

## 2023-02-22 NOTE — PROGRESS NOTES
Subjective:     CHIEF COMPLAINT  Chief Complaint   Patient presents with    Pharyngitis     X 4 days         HPI  Ulysses Coleman is a very pleasant 77 y.o. male who presents to clinic with sore throat x4 days.  Patient describes moderate pain every time he swallows.  No difficulty swallowing or handling secretions.  He denies any associated fevers, chills or myalgias.  No cough or congestion.  He has been taking throat lozenges with minimal improvement.  No known ill contacts.    REVIEW OF SYSTEMS  Review of Systems   Constitutional:  Negative for chills, diaphoresis, fever and malaise/fatigue.   HENT:  Positive for sore throat. Negative for congestion, ear pain and sinus pain.    Respiratory:  Negative for cough, sputum production, shortness of breath and wheezing.    Cardiovascular:  Negative for chest pain and palpitations.   Gastrointestinal:  Negative for abdominal pain, diarrhea, nausea and vomiting.   Musculoskeletal:  Negative for myalgias.   Skin:  Negative for rash.   Neurological:  Negative for dizziness and headaches.   Endo/Heme/Allergies:  Negative for environmental allergies.     PAST MEDICAL HISTORY  There are no problems to display for this patient.      SURGICAL HISTORY  patient denies any surgical history    ALLERGIES  No Known Allergies    CURRENT MEDICATIONS  Home Medications       Reviewed by Eulalio Allen P.A.-C. (Physician Assistant) on 02/21/23 at 1605  Med List Status: <None>     Medication Last Dose Status   mupirocin (BACTROBAN) 2 % Ointment Not Taking Active                    SOCIAL HISTORY  Social History     Tobacco Use    Smoking status: Never    Smokeless tobacco: Never   Vaping Use    Vaping Use: Never used   Substance and Sexual Activity    Alcohol use: Yes     Comment: occ    Drug use: No    Sexual activity: Not on file       FAMILY HISTORY  Family History   Problem Relation Age of Onset    Stroke Neg Hx     Heart Disease Neg Hx           Objective:     VITAL SIGNS: /70    Pulse 74   Temp 36.6 °C (97.8 °F)   Resp 16   Ht 1.829 m (6')   Wt 77.6 kg (171 lb)   SpO2 99%   BMI 23.19 kg/m²     PHYSICAL EXAM  Physical Exam  Constitutional:       General: He is not in acute distress.     Appearance: Normal appearance. He is not ill-appearing, toxic-appearing or diaphoretic.   HENT:      Head: Normocephalic and atraumatic.      Right Ear: Tympanic membrane, ear canal and external ear normal.      Left Ear: Tympanic membrane, ear canal and external ear normal.      Nose: Nose normal. No congestion or rhinorrhea.      Mouth/Throat:      Mouth: Mucous membranes are moist.      Pharynx: No oropharyngeal exudate or posterior oropharyngeal erythema.      Comments: Posterior oropharynx nonerythematous.  No edema or exudate.  Midline uvula without deviation.  small amounts of postnasal drip.  Eyes:      Conjunctiva/sclera: Conjunctivae normal.   Cardiovascular:      Rate and Rhythm: Normal rate and regular rhythm.      Pulses: Normal pulses.      Heart sounds: Normal heart sounds.   Pulmonary:      Effort: Pulmonary effort is normal.      Breath sounds: Normal breath sounds. No wheezing.   Musculoskeletal:      Cervical back: Normal range of motion. No muscular tenderness.   Lymphadenopathy:      Cervical: No cervical adenopathy.   Skin:     General: Skin is warm and dry.      Capillary Refill: Capillary refill takes less than 2 seconds.   Neurological:      Mental Status: He is alert.   Psychiatric:         Mood and Affect: Mood normal.         Thought Content: Thought content normal.     POCT strep: Negative    Assessment/Plan:     1. Pharyngitis, unspecified etiology  - POCT Rapid Strep A  - lidocaine (XYLOCAINE) 2 % Solution; Take 5 mL by mouth every 3 hours as needed for Throat/Mouth Pain for up to 5 days.  Dispense: 100 mL; Refill: 0  - fluticasone (FLONASE) 50 MCG/ACT nasal spray; Administer 2 Sprays into affected nostril(S) every day.  Dispense: 16 g; Refill: 0    2. PND (post-nasal  drip)      MDM/Comments:    The patient's rapid strep was NEGATIVE  We discussed a throat culture but due to short duration of symptoms and the fact it is unlikely to  we will hold off for now.  If the patietn continues to have symptoms I advised to return.  Return precautions given for PTA and worsening symptoms.    Supportive care discussed with salt water gargles and throat lozenges including benzocaine lozenges  I considered other causes of pharyngitis including Group C, G strep, peritonsillar abscess, deon's angina, and retropharyngeal abscess but the patient's reported symptoms and my exam do not support these alternative diagnosis based on information I have available today.  This may change and I encouraged the patient to return to clinic if they are experiencing new symptoms or their symptoms fail to resolve with time as we cannot rule out all pathology from a single Urgent Care visit.     Differential diagnosis, natural history, supportive care, and indications for immediate follow-up discussed. All questions answered. Patient agrees with the plan of care.    Follow-up as needed if symptoms worsen or fail to improve to PCP, Urgent care or Emergency Room.    I have personally reviewed prior external notes and test results pertinent to today's visit.  I have independently reviewed and interpreted all diagnostics ordered during this urgent care acute visit.   Discussed management options (risks,benefits, and alternatives to treatment). Pt expresses understanding and the treatment plan was agreed upon. Questions were encouraged and answered to pt's satisfaction.    Please note that this dictation was created using voice recognition software. I have made a reasonable attempt to correct obvious errors, but I expect that there are errors of grammar and possibly content that I did not discover before finalizing the note.

## 2023-04-18 ENCOUNTER — OFFICE VISIT (OUTPATIENT)
Dept: URGENT CARE | Facility: PHYSICIAN GROUP | Age: 78
End: 2023-04-18
Payer: COMMERCIAL

## 2023-04-18 VITALS
TEMPERATURE: 97.2 F | WEIGHT: 170 LBS | DIASTOLIC BLOOD PRESSURE: 80 MMHG | SYSTOLIC BLOOD PRESSURE: 140 MMHG | BODY MASS INDEX: 23.03 KG/M2 | RESPIRATION RATE: 18 BRPM | OXYGEN SATURATION: 98 % | HEART RATE: 65 BPM | HEIGHT: 72 IN

## 2023-04-18 DIAGNOSIS — M25.512 ACUTE PAIN OF BOTH SHOULDERS: ICD-10-CM

## 2023-04-18 DIAGNOSIS — J01.40 ACUTE NON-RECURRENT PANSINUSITIS: ICD-10-CM

## 2023-04-18 DIAGNOSIS — L01.00 IMPETIGO: ICD-10-CM

## 2023-04-18 DIAGNOSIS — M25.511 ACUTE PAIN OF BOTH SHOULDERS: ICD-10-CM

## 2023-04-18 PROCEDURE — 99213 OFFICE O/P EST LOW 20 MIN: CPT | Mod: 25 | Performed by: PHYSICIAN ASSISTANT

## 2023-04-18 RX ORDER — AMOXICILLIN AND CLAVULANATE POTASSIUM 875; 125 MG/1; MG/1
1 TABLET, FILM COATED ORAL 2 TIMES DAILY
Qty: 14 TABLET | Refills: 0 | Status: SHIPPED | OUTPATIENT
Start: 2023-04-18 | End: 2023-04-25

## 2023-04-18 ASSESSMENT — ENCOUNTER SYMPTOMS
SINUS PAIN: 1
CHILLS: 0
FEVER: 0
PALPITATIONS: 0

## 2023-04-18 NOTE — PROGRESS NOTES
Subjective:   Ulysses Coleman is a 77 y.o. male who presents today with   Chief Complaint   Patient presents with    Sinusitis     X 1 wk sinus pressure, runny nose, nose is red/irritated. Shoulder pain both shoulder     Sinus Problem  This is a new problem. The current episode started in the past 7 days. The problem has been gradually worsening since onset. There has been no fever. The pain is mild. Associated symptoms include congestion. Pertinent negatives include no chills.   Regarding his shoulder pain he is having pain of both shoulders.  The right shoulder seem to improve after having injection but the left shoulder has been more persistent.  He has seen [facility] in the past for this.  Patient states he also noticed some new bumps show up on his nose over the last couple of days.    PMH:  has no past medical history of Asthma, Cancer ([facility]), Diabetes ([facility]), Hyperlipidemia, or Hypertension.  MEDS:   Current Outpatient Medications:     amoxicillin-clavulanate (AUGMENTIN) 875-125 MG Tab, Take 1 Tablet by mouth 2 times a day for 7 days., Disp: 14 Tablet, Rfl: 0    mupirocin (BACTROBAN) 2 % Ointment, Apply 1 Application. topically 2 times a day., Disp: 22 g, Rfl: 0  ALLERGIES: No Known Allergies  SURGHX: History reviewed. No pertinent surgical history.  SOCHX:  reports that he has never smoked. He has never used smokeless tobacco. He reports current alcohol use. He reports that he does not use drugs.  FH: Reviewed with patient, not pertinent to this visit.     Review of Systems   Constitutional:  Negative for chills and fever.   HENT:  Positive for congestion and sinus pain.    Cardiovascular:  Negative for chest pain and palpitations.   Musculoskeletal:         Bilateral shoulder pain   Skin:  Positive for rash.      Objective:   BP (!) 140/80 (BP Location: Left arm, Patient Position: Sitting, BP Cuff Size: Adult)   Pulse 65   Temp 36.2 °C (97.2 °F) (Temporal)   Resp 18   Ht 1.829 m (6')   Wt 77.1 kg  (170 lb)   SpO2 98%   BMI 23.06 kg/m²   Physical Exam  Vitals and nursing note reviewed.   Constitutional:       General: He is not in acute distress.     Appearance: Normal appearance. He is well-developed. He is not ill-appearing or toxic-appearing.   HENT:      Head: Normocephalic and atraumatic.      Right Ear: Hearing normal.      Left Ear: Hearing normal.      Nose: Mucosal edema and congestion present.      Right Sinus: Maxillary sinus tenderness and frontal sinus tenderness present.      Left Sinus: Maxillary sinus tenderness and frontal sinus tenderness present.   Cardiovascular:      Rate and Rhythm: Normal rate and regular rhythm.      Heart sounds: Normal heart sounds.   Pulmonary:      Effort: Pulmonary effort is normal.      Breath sounds: Normal breath sounds. No stridor. No wheezing, rhonchi or rales.   Musculoskeletal:      Comments: Tenderness to palpation to the anterior aspect of the left shoulder.  Patient has discomfort when raising his arm to about shoulder height.  Deficits palpated bilaterally.  Patient has normal range of motion of the right upper extremity compared to the left.  Neurovascular intact distally.  No swelling noted.     Skin:     General: Skin is warm and dry.      Comments: Erythematous rash to the outer portion of the nostrils bilaterally with honey crusted discharge.   Neurological:      Mental Status: He is alert.      Coordination: Coordination normal.   Psychiatric:         Mood and Affect: Mood normal.       Assessment/Plan:   Assessment    1. Acute non-recurrent pansinusitis  - amoxicillin-clavulanate (AUGMENTIN) 875-125 MG Tab; Take 1 Tablet by mouth 2 times a day for 7 days.  Dispense: 14 Tablet; Refill: 0    2. Impetigo  - mupirocin (BACTROBAN) 2 % Ointment; Apply 1 Application. topically 2 times a day.  Dispense: 22 g; Refill: 0    3. Acute pain of both shoulders  - Referral to Sports Medicine  Symptoms and presentation consistent with sinus infection as well as  impetigo  on the nostrils bilaterally.  Would recommend he follow-up with sports medicine regarding his shoulder pain.  No new acute concerning findings and likely most consistent with osteoarthritis and degenerative changes found on x-ray previously.    Differential diagnosis, natural history, supportive care, and indications for immediate follow-up discussed.   Patient given instructions and understanding of medications and treatment.    If not improving in 3-5 days, F/U with PCP or return to  if symptoms worsen.    Patient agreeable to plan.      Please note that this dictation was created using voice recognition software. I have made every reasonable attempt to correct obvious errors, but I expect that there are errors of grammar and possibly content that I did not discover before finalizing the note.    Murphy Lyons PA-C

## 2023-04-24 ENCOUNTER — TELEPHONE (OUTPATIENT)
Dept: HEALTH INFORMATION MANAGEMENT | Facility: OTHER | Age: 78
End: 2023-04-24
Payer: COMMERCIAL

## 2023-04-24 NOTE — TELEPHONE ENCOUNTER
OUTCOME: CALLED PT TO [facility] REFERRAL.     PLEASE TRANSFER TO [facility] [phone] WHEN PT RETURNS CALL.     ATTEMPT # 1.

## 2023-08-30 ENCOUNTER — OFFICE VISIT (OUTPATIENT)
Dept: URGENT CARE | Facility: PHYSICIAN GROUP | Age: 78
End: 2023-08-30
Payer: COMMERCIAL

## 2023-08-30 VITALS
WEIGHT: 165 LBS | HEART RATE: 64 BPM | OXYGEN SATURATION: 98 % | DIASTOLIC BLOOD PRESSURE: 60 MMHG | SYSTOLIC BLOOD PRESSURE: 108 MMHG | TEMPERATURE: 97.8 F | HEIGHT: 72 IN | BODY MASS INDEX: 22.35 KG/M2 | RESPIRATION RATE: 18 BRPM

## 2023-08-30 DIAGNOSIS — R09.81 NASAL CONGESTION: ICD-10-CM

## 2023-08-30 DIAGNOSIS — R05.1 ACUTE COUGH: ICD-10-CM

## 2023-08-30 LAB
FLUAV RNA SPEC QL NAA+PROBE: NEGATIVE
FLUBV RNA SPEC QL NAA+PROBE: NEGATIVE
RSV RNA SPEC QL NAA+PROBE: NEGATIVE
SARS-COV-2 RNA RESP QL NAA+PROBE: NEGATIVE

## 2023-08-30 PROCEDURE — 3078F DIAST BP <80 MM HG: CPT | Performed by: FAMILY MEDICINE

## 2023-08-30 PROCEDURE — 3074F SYST BP LT 130 MM HG: CPT | Performed by: FAMILY MEDICINE

## 2023-08-30 PROCEDURE — 0241U POCT CEPHEID COV-2, FLU A/B, RSV - PCR: CPT | Performed by: FAMILY MEDICINE

## 2023-08-30 PROCEDURE — 99213 OFFICE O/P EST LOW 20 MIN: CPT | Performed by: FAMILY MEDICINE

## 2023-08-30 RX ORDER — BENZONATATE 100 MG/1
100 CAPSULE ORAL 3 TIMES DAILY PRN
Qty: 30 CAPSULE | Refills: 0 | Status: SHIPPED | OUTPATIENT
Start: 2023-08-30 | End: 2024-03-04

## 2023-08-30 RX ORDER — ASPIRIN 81 MG/1
TABLET ORAL
COMMUNITY

## 2023-08-30 ASSESSMENT — ENCOUNTER SYMPTOMS
NAUSEA: 0
WEIGHT LOSS: 0
EYE DISCHARGE: 0
EYE REDNESS: 0
MYALGIAS: 0
VOMITING: 0

## 2023-08-31 NOTE — PROGRESS NOTES
Subjective     Ulysses Coleman is a 77 y.o. male who presents with Nasal Congestion (X1day congestion)            1 day nasal congestion. Exposed to COVID 19.  No fever. Mild cough. No SOB/wheeze. No ST. No other aggravating or alleviating factors.          Review of Systems   Constitutional:  Negative for malaise/fatigue and weight loss.   Eyes:  Negative for discharge and redness.   Gastrointestinal:  Negative for nausea and vomiting.   Musculoskeletal:  Negative for joint pain and myalgias.   Skin:  Negative for itching and rash.              Objective     /60 (BP Location: Left arm, Patient Position: Sitting, BP Cuff Size: Adult)   Pulse 64   Temp 36.6 °C (97.8 °F) (Temporal)   Resp 18   Ht 1.829 m (6')   Wt 74.8 kg (165 lb)   SpO2 98%   BMI 22.38 kg/m²      Physical Exam  Constitutional:       General: He is not in acute distress.     Appearance: He is well-developed.   HENT:      Head: Normocephalic and atraumatic.      Right Ear: Tympanic membrane normal.      Nose: Congestion present.      Mouth/Throat:      Mouth: Mucous membranes are moist.      Pharynx: No posterior oropharyngeal erythema.   Eyes:      Conjunctiva/sclera: Conjunctivae normal.   Cardiovascular:      Rate and Rhythm: Normal rate and regular rhythm.      Heart sounds: Normal heart sounds. No murmur heard.  Pulmonary:      Effort: Pulmonary effort is normal.      Breath sounds: Normal breath sounds. No wheezing.   Musculoskeletal:      Cervical back: Neck supple.   Lymphadenopathy:      Cervical: No cervical adenopathy.   Skin:     General: Skin is warm and dry.      Findings: No rash.   Neurological:      Mental Status: He is alert.                             Assessment & Plan      Poct c19, influenza, and rsv negative     1. Nasal congestion  POCT CoV-2, Flu A/B, RSV by PCR      2. Acute cough  benzonatate (TESSALON PERLES) 100 MG Cap    POCT CoV-2, Flu A/B, RSV by PCR        Differential diagnosis, natural history, supportive  care, and indications for immediate follow-up were discussed.

## 2023-12-07 ENCOUNTER — OFFICE VISIT (OUTPATIENT)
Dept: URGENT CARE | Facility: PHYSICIAN GROUP | Age: 78
End: 2023-12-07
Payer: COMMERCIAL

## 2023-12-07 ENCOUNTER — HOSPITAL ENCOUNTER (OUTPATIENT)
Dept: RADIOLOGY | Facility: MEDICAL CENTER | Age: 78
End: 2023-12-07
Attending: PHYSICIAN ASSISTANT
Payer: COMMERCIAL

## 2023-12-07 VITALS
SYSTOLIC BLOOD PRESSURE: 124 MMHG | OXYGEN SATURATION: 99 % | TEMPERATURE: 97.9 F | RESPIRATION RATE: 18 BRPM | BODY MASS INDEX: 22.35 KG/M2 | WEIGHT: 165 LBS | HEART RATE: 66 BPM | HEIGHT: 72 IN | DIASTOLIC BLOOD PRESSURE: 82 MMHG

## 2023-12-07 DIAGNOSIS — M25.512 ACUTE PAIN OF LEFT SHOULDER: ICD-10-CM

## 2023-12-07 DIAGNOSIS — M77.8 LEFT SHOULDER TENDONITIS: ICD-10-CM

## 2023-12-07 PROCEDURE — 73030 X-RAY EXAM OF SHOULDER: CPT | Mod: LT

## 2023-12-07 PROCEDURE — 99213 OFFICE O/P EST LOW 20 MIN: CPT | Performed by: PHYSICIAN ASSISTANT

## 2023-12-07 PROCEDURE — 3074F SYST BP LT 130 MM HG: CPT | Performed by: PHYSICIAN ASSISTANT

## 2023-12-07 PROCEDURE — 3079F DIAST BP 80-89 MM HG: CPT | Performed by: PHYSICIAN ASSISTANT

## 2023-12-07 RX ORDER — METHYLPREDNISOLONE 4 MG/1
4 TABLET ORAL DAILY
Qty: 21 TABLET | Refills: 0 | Status: SHIPPED | OUTPATIENT
Start: 2023-12-07 | End: 2024-03-04

## 2023-12-07 ASSESSMENT — ENCOUNTER SYMPTOMS: NEUROLOGICAL NEGATIVE: 1

## 2023-12-11 ENCOUNTER — TELEPHONE (OUTPATIENT)
Dept: HEALTH INFORMATION MANAGEMENT | Facility: OTHER | Age: 78
End: 2023-12-11

## 2023-12-13 ENCOUNTER — TELEPHONE (OUTPATIENT)
Dept: HEALTH INFORMATION MANAGEMENT | Facility: OTHER | Age: 78
End: 2023-12-13

## 2024-03-04 ENCOUNTER — OFFICE VISIT (OUTPATIENT)
Dept: URGENT CARE | Facility: PHYSICIAN GROUP | Age: 79
End: 2024-03-04
Payer: COMMERCIAL

## 2024-03-04 VITALS
BODY MASS INDEX: 20.94 KG/M2 | RESPIRATION RATE: 16 BRPM | SYSTOLIC BLOOD PRESSURE: 138 MMHG | HEIGHT: 73 IN | DIASTOLIC BLOOD PRESSURE: 78 MMHG | HEART RATE: 73 BPM | WEIGHT: 158 LBS | OXYGEN SATURATION: 96 % | TEMPERATURE: 97.6 F

## 2024-03-04 DIAGNOSIS — J06.9 URI WITH COUGH AND CONGESTION: ICD-10-CM

## 2024-03-04 PROCEDURE — 3075F SYST BP GE 130 - 139MM HG: CPT | Performed by: PHYSICIAN ASSISTANT

## 2024-03-04 PROCEDURE — 99213 OFFICE O/P EST LOW 20 MIN: CPT | Performed by: PHYSICIAN ASSISTANT

## 2024-03-04 PROCEDURE — 0241U POCT CEPHEID COV-2, FLU A/B, RSV - PCR: CPT | Performed by: PHYSICIAN ASSISTANT

## 2024-03-04 PROCEDURE — 3078F DIAST BP <80 MM HG: CPT | Performed by: PHYSICIAN ASSISTANT

## 2024-03-04 RX ORDER — PREDNISONE 20 MG/1
TABLET ORAL
Qty: 10 TABLET | Refills: 0 | Status: SHIPPED | OUTPATIENT
Start: 2024-03-04

## 2024-03-04 RX ORDER — BENZONATATE 100 MG/1
100 CAPSULE ORAL 3 TIMES DAILY PRN
Qty: 30 CAPSULE | Refills: 0 | Status: SHIPPED | OUTPATIENT
Start: 2024-03-04

## 2024-03-04 ASSESSMENT — ENCOUNTER SYMPTOMS
RHINORRHEA: 1
FEVER: 0
WHEEZING: 0
ABDOMINAL PAIN: 0
CHILLS: 0
SPUTUM PRODUCTION: 1
VOMITING: 0
NAUSEA: 0
COUGH: 1
SINUS PAIN: 1
SHORTNESS OF BREATH: 0
DIARRHEA: 0

## 2024-03-04 ASSESSMENT — COPD QUESTIONNAIRES: COPD: 0

## 2024-03-04 NOTE — PROGRESS NOTES
"Subjective Ulysses Coleman is a 78 y.o. male who presents with Nasal Congestion (X 5 days nasal congestion, chest congestion, coughing up mucus)    Cough  This is a new problem. The current episode started in the past 7 days (started ~5 days ago). The problem has been unchanged. The problem occurs constantly. Cough characteristics: Cough sounds \"wet\" and is sometimes productive of sputum but he feels as though most of the mucus is \"stuck\" Associated symptoms include nasal congestion, postnasal drip and rhinorrhea. Pertinent negatives include no chest pain, chills, fever, shortness of breath or wheezing. Treatments tried: NyQuil. The treatment provided mild relief. There is no history of asthma or COPD.       Review of Systems   Constitutional:  Positive for malaise/fatigue. Negative for chills and fever.   HENT:  Positive for congestion, postnasal drip, rhinorrhea and sinus pain.    Respiratory:  Positive for cough and sputum production. Negative for shortness of breath and wheezing.    Cardiovascular:  Negative for chest pain.   Gastrointestinal:  Negative for abdominal pain, diarrhea, nausea and vomiting.           PMH:  has no past medical history of Asthma, Cancer ([facility]), Diabetes ([facility]), Hyperlipidemia, or Hypertension.  MEDS:   Current Outpatient Medications:     predniSONE (DELTASONE) 20 MG Tab, Take 2 tabs every morning for 5 days, Disp: 10 Tablet, Rfl: 0    aspirin (ASPIRIN ADULT LOW DOSE) 81 MG EC tablet, , Disp: , Rfl:     benzonatate (TESSALON PERLES) 100 MG Cap, Take 1 Capsule by mouth 3 times a day as needed for Cough. (Patient not taking: Reported on 3/4/2024), Disp: 30 Capsule, Rfl: 0  ALLERGIES: No Known Allergies  SURGHX: History reviewed. No pertinent surgical history.  SOCHX:  reports that he has never smoked. He has never used smokeless tobacco. He reports that he does not currently use alcohol. He reports that he does not use drugs.  FH: Family history was reviewed, no pertinent findings to " "report      Objective     /78   Pulse 73   Temp 36.4 °C (97.6 °F) (Temporal)   Resp 16   Ht 1.854 m (6' 1\")   Wt 71.7 kg (158 lb)   SpO2 96%   BMI 20.85 kg/m²      Physical Exam  Constitutional:       Appearance: He is well-developed.   HENT:      Head: Normocephalic and atraumatic.      Right Ear: Tympanic membrane, ear canal and external ear normal.      Left Ear: Tympanic membrane, ear canal and external ear normal.      Nose: Mucosal edema and congestion present. No rhinorrhea.      Mouth/Throat:      Lips: Pink.      Mouth: Mucous membranes are moist.      Pharynx: Oropharynx is clear.   Eyes:      Conjunctiva/sclera: Conjunctivae normal.      Pupils: Pupils are equal, round, and reactive to light.   Cardiovascular:      Rate and Rhythm: Normal rate and regular rhythm.   Pulmonary:      Effort: Pulmonary effort is normal.      Breath sounds: Wheezing present.   Musculoskeletal:      Cervical back: Normal range of motion.   Lymphadenopathy:      Cervical: No cervical adenopathy.   Skin:     General: Skin is warm and dry.      Capillary Refill: Capillary refill takes less than 2 seconds.   Neurological:      Mental Status: He is alert and oriented to person, place, and time.   Psychiatric:         Behavior: Behavior normal.         Judgment: Judgment normal.         POCT CoV-2, Flu A/B, RSV by PCR - Negative      Assessment & Plan     1. URI with cough and congestion  - predniSONE (DELTASONE) 20 MG Tab; Take 2 tabs every morning for 5 days  Dispense: 10 Tablet; Refill: 0  - POCT CoV-2, Flu A/B, RSV by PCR  - benzonatate (TESSALON) 100 MG Cap; Take 1 Capsule by mouth 3 times a day as needed for Cough.  Dispense: 30 Capsule; Refill: 0  - OTC cold/flu medications  -Supportive care also discussed to include the use of saline nasal rinses, steam inhalation, and the use of a cool-mist humidifier in the bedroom at night.  - PO fluids  - Rest  - Tylenol or ibuprofen as needed for fever > 100.4 F  PCR test " negative for COVID, flu, and RSV.  His symptoms and exam findings still seem most consistent with viral etiology, however.  5 days of prednisone sent to pharmacy to help with the wheezing and chest congestion.  Benzonatate capsules also provided for symptom relief.  He may continue to use over-the-counter medications.  Other supportive care also discussed.  If still no significant improvement in his symptoms after another 4 to 5 days he should return for reevaluation or follow-up with his primary care provider.        Differential Diagnosis, natural history, and supportive care discussed. Return to the Urgent Care or follow up with your PCP if symptoms fail to resolve, or for any new or worsening symptoms. Emergency room precautions discussed. Patient and/or family appears understanding of information.

## 2024-03-15 ENCOUNTER — HOSPITAL ENCOUNTER (EMERGENCY)
Facility: MEDICAL CENTER | Age: 79
End: 2024-03-15
Attending: STUDENT IN AN ORGANIZED HEALTH CARE EDUCATION/TRAINING PROGRAM
Payer: COMMERCIAL

## 2024-03-15 ENCOUNTER — APPOINTMENT (OUTPATIENT)
Dept: RADIOLOGY | Facility: MEDICAL CENTER | Age: 79
End: 2024-03-15
Attending: STUDENT IN AN ORGANIZED HEALTH CARE EDUCATION/TRAINING PROGRAM
Payer: COMMERCIAL

## 2024-03-15 VITALS
HEART RATE: 80 BPM | DIASTOLIC BLOOD PRESSURE: 70 MMHG | SYSTOLIC BLOOD PRESSURE: 140 MMHG | HEIGHT: 73 IN | OXYGEN SATURATION: 97 % | BODY MASS INDEX: 20.95 KG/M2 | WEIGHT: 158.07 LBS | RESPIRATION RATE: 16 BRPM | TEMPERATURE: 98.4 F

## 2024-03-15 DIAGNOSIS — R11.2 NAUSEA AND VOMITING, UNSPECIFIED VOMITING TYPE: ICD-10-CM

## 2024-03-15 DIAGNOSIS — J18.9 PNEUMONIA OF BOTH LOWER LOBES DUE TO INFECTIOUS ORGANISM: ICD-10-CM

## 2024-03-15 DIAGNOSIS — I16.0 HYPERTENSIVE URGENCY: ICD-10-CM

## 2024-03-15 LAB
ALBUMIN SERPL BCP-MCNC: 3.8 G/DL (ref 3.2–4.9)
ALBUMIN/GLOB SERPL: 0.9 G/DL
ALP SERPL-CCNC: 63 U/L (ref 30–99)
ALT SERPL-CCNC: 17 U/L (ref 2–50)
ANION GAP SERPL CALC-SCNC: 14 MMOL/L (ref 7–16)
AST SERPL-CCNC: 20 U/L (ref 12–45)
BASOPHILS # BLD AUTO: 0.4 % (ref 0–1.8)
BASOPHILS # BLD: 0.03 K/UL (ref 0–0.12)
BILIRUB SERPL-MCNC: 0.5 MG/DL (ref 0.1–1.5)
BUN SERPL-MCNC: 27 MG/DL (ref 8–22)
CALCIUM ALBUM COR SERPL-MCNC: 9.7 MG/DL (ref 8.5–10.5)
CALCIUM SERPL-MCNC: 9.5 MG/DL (ref 8.5–10.5)
CHLORIDE SERPL-SCNC: 105 MMOL/L (ref 96–112)
CO2 SERPL-SCNC: 23 MMOL/L (ref 20–33)
CREAT SERPL-MCNC: 0.92 MG/DL (ref 0.5–1.4)
EOSINOPHIL # BLD AUTO: 0 K/UL (ref 0–0.51)
EOSINOPHIL NFR BLD: 0 % (ref 0–6.9)
ERYTHROCYTE [DISTWIDTH] IN BLOOD BY AUTOMATED COUNT: 44.4 FL (ref 35.9–50)
GFR SERPLBLD CREATININE-BSD FMLA CKD-EPI: 84 ML/MIN/1.73 M 2
GLOBULIN SER CALC-MCNC: 4.2 G/DL (ref 1.9–3.5)
GLUCOSE SERPL-MCNC: 152 MG/DL (ref 65–99)
HCT VFR BLD AUTO: 39.2 % (ref 42–52)
HGB BLD-MCNC: 12.9 G/DL (ref 14–18)
IMM GRANULOCYTES # BLD AUTO: 0.04 K/UL (ref 0–0.11)
IMM GRANULOCYTES NFR BLD AUTO: 0.6 % (ref 0–0.9)
LACTATE SERPL-SCNC: 1.7 MMOL/L (ref 0.5–2)
LIPASE SERPL-CCNC: 23 U/L (ref 11–82)
LYMPHOCYTES # BLD AUTO: 0.61 K/UL (ref 1–4.8)
LYMPHOCYTES NFR BLD: 9.1 % (ref 22–41)
MCH RBC QN AUTO: 29.8 PG (ref 27–33)
MCHC RBC AUTO-ENTMCNC: 32.9 G/DL (ref 32.3–36.5)
MCV RBC AUTO: 90.5 FL (ref 81.4–97.8)
MONOCYTES # BLD AUTO: 0.48 K/UL (ref 0–0.85)
MONOCYTES NFR BLD AUTO: 7.2 % (ref 0–13.4)
NEUTROPHILS # BLD AUTO: 5.55 K/UL (ref 1.82–7.42)
NEUTROPHILS NFR BLD: 82.7 % (ref 44–72)
NRBC # BLD AUTO: 0 K/UL
NRBC BLD-RTO: 0 /100 WBC (ref 0–0.2)
PLATELET # BLD AUTO: 305 K/UL (ref 164–446)
PMV BLD AUTO: 10.3 FL (ref 9–12.9)
POTASSIUM SERPL-SCNC: 5.3 MMOL/L (ref 3.6–5.5)
PROT SERPL-MCNC: 8 G/DL (ref 6–8.2)
RBC # BLD AUTO: 4.33 M/UL (ref 4.7–6.1)
SODIUM SERPL-SCNC: 142 MMOL/L (ref 135–145)
WBC # BLD AUTO: 6.7 K/UL (ref 4.8–10.8)

## 2024-03-15 PROCEDURE — 700117 HCHG RX CONTRAST REV CODE 255: Performed by: STUDENT IN AN ORGANIZED HEALTH CARE EDUCATION/TRAINING PROGRAM

## 2024-03-15 PROCEDURE — 700102 HCHG RX REV CODE 250 W/ 637 OVERRIDE(OP): Performed by: STUDENT IN AN ORGANIZED HEALTH CARE EDUCATION/TRAINING PROGRAM

## 2024-03-15 PROCEDURE — 96375 TX/PRO/DX INJ NEW DRUG ADDON: CPT

## 2024-03-15 PROCEDURE — 83605 ASSAY OF LACTIC ACID: CPT

## 2024-03-15 PROCEDURE — 74177 CT ABD & PELVIS W/CONTRAST: CPT

## 2024-03-15 PROCEDURE — 93005 ELECTROCARDIOGRAM TRACING: CPT | Performed by: STUDENT IN AN ORGANIZED HEALTH CARE EDUCATION/TRAINING PROGRAM

## 2024-03-15 PROCEDURE — 99285 EMERGENCY DEPT VISIT HI MDM: CPT

## 2024-03-15 PROCEDURE — 83690 ASSAY OF LIPASE: CPT

## 2024-03-15 PROCEDURE — 700111 HCHG RX REV CODE 636 W/ 250 OVERRIDE (IP): Mod: JZ | Performed by: STUDENT IN AN ORGANIZED HEALTH CARE EDUCATION/TRAINING PROGRAM

## 2024-03-15 PROCEDURE — 80053 COMPREHEN METABOLIC PANEL: CPT

## 2024-03-15 PROCEDURE — 700105 HCHG RX REV CODE 258: Performed by: STUDENT IN AN ORGANIZED HEALTH CARE EDUCATION/TRAINING PROGRAM

## 2024-03-15 PROCEDURE — A9270 NON-COVERED ITEM OR SERVICE: HCPCS | Performed by: STUDENT IN AN ORGANIZED HEALTH CARE EDUCATION/TRAINING PROGRAM

## 2024-03-15 PROCEDURE — 96374 THER/PROPH/DIAG INJ IV PUSH: CPT

## 2024-03-15 PROCEDURE — 36415 COLL VENOUS BLD VENIPUNCTURE: CPT

## 2024-03-15 PROCEDURE — 85025 COMPLETE CBC W/AUTO DIFF WBC: CPT

## 2024-03-15 RX ORDER — DOXYCYCLINE 100 MG/1
100 TABLET ORAL ONCE
Status: COMPLETED | OUTPATIENT
Start: 2024-03-15 | End: 2024-03-15

## 2024-03-15 RX ORDER — AMOXICILLIN 250 MG
1 CAPSULE ORAL DAILY
Status: DISCONTINUED | OUTPATIENT
Start: 2024-03-15 | End: 2024-03-15 | Stop reason: HOSPADM

## 2024-03-15 RX ORDER — ONDANSETRON 2 MG/ML
4 INJECTION INTRAMUSCULAR; INTRAVENOUS ONCE
Status: COMPLETED | OUTPATIENT
Start: 2024-03-15 | End: 2024-03-15

## 2024-03-15 RX ORDER — POLYETHYLENE GLYCOL 3350 17 G/17G
1 POWDER, FOR SOLUTION ORAL DAILY
Status: DISCONTINUED | OUTPATIENT
Start: 2024-03-15 | End: 2024-03-15 | Stop reason: HOSPADM

## 2024-03-15 RX ORDER — ONDANSETRON 4 MG/1
4 TABLET, ORALLY DISINTEGRATING ORAL EVERY 6 HOURS PRN
Qty: 10 TABLET | Refills: 0 | Status: SHIPPED | OUTPATIENT
Start: 2024-03-15

## 2024-03-15 RX ORDER — POLYETHYLENE GLYCOL 3350 17 G/17G
17 POWDER, FOR SOLUTION ORAL DAILY
Qty: 30 PACKET | Refills: 0 | Status: SHIPPED | OUTPATIENT
Start: 2024-03-15 | End: 2024-04-14

## 2024-03-15 RX ORDER — HYDRALAZINE HYDROCHLORIDE 20 MG/ML
10 INJECTION INTRAMUSCULAR; INTRAVENOUS ONCE
Status: COMPLETED | OUTPATIENT
Start: 2024-03-15 | End: 2024-03-15

## 2024-03-15 RX ORDER — SODIUM CHLORIDE, SODIUM LACTATE, POTASSIUM CHLORIDE, CALCIUM CHLORIDE 600; 310; 30; 20 MG/100ML; MG/100ML; MG/100ML; MG/100ML
500 INJECTION, SOLUTION INTRAVENOUS ONCE
Status: COMPLETED | OUTPATIENT
Start: 2024-03-15 | End: 2024-03-15

## 2024-03-15 RX ORDER — DOXYCYCLINE 100 MG/1
100 CAPSULE ORAL 2 TIMES DAILY
Qty: 14 CAPSULE | Refills: 0 | Status: ACTIVE | OUTPATIENT
Start: 2024-03-15 | End: 2024-03-22

## 2024-03-15 RX ADMIN — SODIUM CHLORIDE, POTASSIUM CHLORIDE, SODIUM LACTATE AND CALCIUM CHLORIDE 500 ML: 600; 310; 30; 20 INJECTION, SOLUTION INTRAVENOUS at 05:06

## 2024-03-15 RX ADMIN — HYDRALAZINE HYDROCHLORIDE 10 MG: 20 INJECTION, SOLUTION INTRAMUSCULAR; INTRAVENOUS at 05:01

## 2024-03-15 RX ADMIN — POLYETHYLENE GLYCOL 3350 1 PACKET: 17 POWDER, FOR SOLUTION ORAL at 06:50

## 2024-03-15 RX ADMIN — ONDANSETRON 4 MG: 2 INJECTION INTRAMUSCULAR; INTRAVENOUS at 06:49

## 2024-03-15 RX ADMIN — DOXYCYCLINE 100 MG: 100 TABLET, FILM COATED ORAL at 06:50

## 2024-03-15 RX ADMIN — DOCUSATE SODIUM 50 MG AND SENNOSIDES 8.6 MG 1 TABLET: 8.6; 5 TABLET, FILM COATED ORAL at 06:49

## 2024-03-15 RX ADMIN — IOHEXOL 100 ML: 350 INJECTION, SOLUTION INTRAVENOUS at 05:23

## 2024-03-15 NOTE — DISCHARGE INSTRUCTIONS
Take the stool softeners as prescribed, take the Zofran as needed for nausea and vomiting, take the antibiotics twice daily until the prescription is completed, return if you have difficulty breathing, severe worsening pain, unable to eat or drink, other new concerning symptoms    Make an appointment with your primary care doctor, your blood pressure was still quite elevated today, this can lead to significant health problems and needs to be followed up.   Walk in [employer]

## 2024-03-15 NOTE — ED NOTES
Pt ambulated to red 1 with steady gait. Pt assisted into gown, attached to monitor.  VSS on RA. Call light in reach, chart up for ERP.

## 2024-03-15 NOTE — ED NOTES
Pt medicated per MAR, education provided.  Pt verbalized understanding. Pt provided jacquelyn crackers for PO challenge.

## 2024-03-15 NOTE — ED PROVIDER NOTES
"  ER Provider Note    Scribed for Antony Nevarez M.D. by Sony Valdivia. 3/15/2024   4:38 AM    Primary Care Provider: Pcp Pt States None    CHIEF COMPLAINT  Chief Complaint   Patient presents with    Abdominal Pain     Abd pain w/ N/V going on for approx 3-4 days          HPI/ROS  LIMITATION TO HISTORY   Select: : None  OUTSIDE HISTORIAN(S):  None    Ulysses Coleman is a 78 y.o. male who presents to the ED for evaluation of abdominal pain onset 4 days ago.The patient explains he has been having lower abdominal pain for 4 days now. He states that he has been coughing for the past 10 days, has rhinorrhea for over a week now, and notes having a cold. He adds that he had an episode of diarrhea earlier today. He states that no one else in his household is experiencing similar symptoms. The patient denies fever. He also denies taking blood pressure medications. He adds he takes Vitamin C.  Denies dysuria frequency or urgency    PAST MEDICAL HISTORY  History reviewed. No pertinent past medical history.    SURGICAL HISTORY  History reviewed. No pertinent surgical history.    FAMILY HISTORY  Family History   Problem Relation Age of Onset    Stroke Neg Hx     Heart Disease Neg Hx        SOCIAL HISTORY   reports that he has never smoked. He has never used smokeless tobacco. He reports current alcohol use. He reports that he does not use drugs.    CURRENT MEDICATIONS  Previous Medications    ASPIRIN (ASPIRIN ADULT LOW DOSE) 81 MG EC TABLET        BENZONATATE (TESSALON) 100 MG CAP    Take 1 Capsule by mouth 3 times a day as needed for Cough.    PREDNISONE (DELTASONE) 20 MG TAB    Take 2 tabs every morning for 5 days       ALLERGIES  No Known Allergies     PHYSICAL EXAM  BP (!) 156/76   Pulse 81   Temp 36.2 °C (97.2 °F) (Temporal)   Resp 16   Ht 1.854 m (6' 1\")   Wt 71.7 kg (158 lb 1.1 oz)   SpO2 93%   BMI 20.85 kg/m²    General: nontoxic appearing  Head: Normocephalic atraumatic  Eyes: Extraocular motion intact  Neck: " Supple, no rigidity  Cardiovascular: aorta appears within normal limits, Regular rate and rhythm no murmurs rubs or gallops  Respiratory: Clear to auscultation bilaterally, equal chest rise and fall, no increased work of breathing  Abdomen: Mild distension and no focal tenderness, Soft no guarding  Musculoskeletal: Warm and well perfused, no peripheral edema  Neuro: Alert, no focal deficits  Integumentary: No wounds or rashes      DIAGNOSTIC STUDIES    Labs:   Labs Reviewed   CBC WITH DIFFERENTIAL - Abnormal; Notable for the following components:       Result Value    RBC 4.33 (*)     Hemoglobin 12.9 (*)     Hematocrit 39.2 (*)     Neutrophils-Polys 82.70 (*)     Lymphocytes 9.10 (*)     Lymphs (Absolute) 0.61 (*)     All other components within normal limits   COMP METABOLIC PANEL - Abnormal; Notable for the following components:    Glucose 152 (*)     Bun 27 (*)     Globulin 4.2 (*)     All other components within normal limits   LIPASE   LACTIC ACID   ESTIMATED GFR   URINALYSIS       Radiology:       Radiologist interpretation:   CT-ABDOMEN-PELVIS WITH   Final Result         1.  Hazy bilateral lower lobe infiltrates, greater on the right   2.  Atherosclerosis and atherosclerotic coronary artery disease           INITIAL ASSESSMENT COURSE AND PLAN  Care Narrative      4:38 AM - Patient was evaluated at bedside. Ordered for Lactic Acid, CBC with diff, CMP, Lipase, UA and CT-Abdomen-Pelvis to evaluate. Patient verbalizes understanding and support with my plan of care.  Differential diagnoses include but not limited to: Malignancy small bowel obstruction, appendicitis, hernia, abscess diverticulitis    Labs, imaging reassuring, lactate not elevated, low suspicion for mesenteric ischemia, mild anemia with unknown baseline, patient symptoms are improved, he is eating crackers, tolerating p.o., will treat for constipation, strict return precautions, patient advised need to follow-up with PCP and establish, referral was  placed.    Patient also notes weeks of chronic cough, rhinorrhea, CT is concerning for pneumonia, will be treated with doxycycline, no recent hospitalization    6:22 AM - Patient was reevaluated at bedside. Discussed lab and radiology results with the patient and informed them of plan of discharge. I advised the patient to take the stool softeners as prescribed and to return if they have difficulty breathing. Patient had the opportunity to ask any questions. The plan for discharge was discussed with them and they were told to return for any new or worsening symptoms. He was also informed of the plans for follow up. Patient is understanding and agreeable to the plan for discharge.    ED Observation Status? No; Patient does not meet criteria for ED Observation.         DISPOSITION AND DISCUSSIONS        Barriers to care at this time, including but not limited to: Patient does not have established PCP.     Decision tools and prescription drugs considered including, but not limited to:  Monodox, Zofran, Miralax .    The patient will return for new or worsening symptoms and is stable at the time of discharge.    The patient is referred to a primary physician for blood pressure management, diabetic screening, and for all other preventative health concerns.    DISPOSITION:  Patient will be discharged home in stable condition.    FOLLOW UP:  No follow-up provider specified.    OUTPATIENT MEDICATIONS:  New Prescriptions    DOXYCYCLINE (MONODOX) 100 MG CAPSULE    Take 1 Capsule by mouth 2 times a day for 7 days.    ONDANSETRON (ZOFRAN ODT) 4 MG TABLET DISPERSIBLE    Take 1 Tablet by mouth every 6 hours as needed for Nausea/Vomiting.    POLYETHYLENE GLYCOL/LYTES (MIRALAX) PACK    Take 1 Packet by mouth every day for 30 days.       FINAL DIAGNOSIS  1. Nausea and vomiting, unspecified vomiting type    2. Pneumonia of both lower lobes due to infectious organism    3. Hypertensive urgency         Sony ROBLES (Scrroz), am  scribing for, and in the presence of, Niko Nevarez M.D..    Electronically signed by: Sony Valdivia (Scribe), 3/15/2024    INiko M.D. personally performed the services described in this documentation, as scribed by Sony Valdivia in my presence, and it is both accurate and complete.      The note accurately reflects work and decisions made by me.  Niko Nevarez M.D.  3/15/2024  7:40 AM

## 2024-03-15 NOTE — ED NOTES
Report received from NORMA Suh. Patient AAO x 4. MCCLAIN. On room air.  Discharge instructions and follow-up care reviewed with patient. All questions answered and patient verbalized understanding. Vss. Patient stable and ambulatory upon d/c from ED.

## 2024-03-15 NOTE — ED TRIAGE NOTES
"Chief Complaint   Patient presents with    Abdominal Pain     Abd pain w/ N/V going on for approx 3-4 days      BP (!) 156/76   Pulse 81   Temp 36.2 °C (97.2 °F) (Temporal)   Resp 16   Ht 1.854 m (6' 1\")   Wt 71.7 kg (158 lb 1.1 oz)   SpO2 93%   BMI 20.85 kg/m²     Pt here for above cc  Reports abd pain w/ N/V for 3-4 days. Reports a couple of episodes of diarrhea earlier today  Pain is lower but across entire abd  Abd pain protocol ordered    Pt to phleb for lab draw  "

## 2024-04-04 ENCOUNTER — OFFICE VISIT (OUTPATIENT)
Dept: URGENT CARE | Facility: PHYSICIAN GROUP | Age: 79
End: 2024-04-04
Payer: COMMERCIAL

## 2024-04-04 VITALS
OXYGEN SATURATION: 95 % | TEMPERATURE: 96.7 F | HEART RATE: 57 BPM | RESPIRATION RATE: 16 BRPM | SYSTOLIC BLOOD PRESSURE: 138 MMHG | HEIGHT: 73 IN | DIASTOLIC BLOOD PRESSURE: 80 MMHG | WEIGHT: 160 LBS | BODY MASS INDEX: 21.2 KG/M2

## 2024-04-04 DIAGNOSIS — J01.90 ACUTE BACTERIAL SINUSITIS: ICD-10-CM

## 2024-04-04 DIAGNOSIS — J02.9 ACUTE PHARYNGITIS, UNSPECIFIED ETIOLOGY: ICD-10-CM

## 2024-04-04 DIAGNOSIS — B96.89 ACUTE BACTERIAL SINUSITIS: ICD-10-CM

## 2024-04-04 PROCEDURE — 3079F DIAST BP 80-89 MM HG: CPT | Performed by: NURSE PRACTITIONER

## 2024-04-04 PROCEDURE — 3075F SYST BP GE 130 - 139MM HG: CPT | Performed by: NURSE PRACTITIONER

## 2024-04-04 PROCEDURE — 99214 OFFICE O/P EST MOD 30 MIN: CPT | Performed by: NURSE PRACTITIONER

## 2024-04-04 RX ORDER — CEFUROXIME AXETIL 500 MG/1
500 TABLET ORAL 2 TIMES DAILY
Qty: 10 TABLET | Refills: 0 | Status: SHIPPED | OUTPATIENT
Start: 2024-04-04 | End: 2024-04-09

## 2024-04-04 ASSESSMENT — FIBROSIS 4 INDEX: FIB4 SCORE: 1.26

## 2024-04-04 ASSESSMENT — ENCOUNTER SYMPTOMS
HEADACHES: 0
NAUSEA: 0
EYE DISCHARGE: 0
CHILLS: 0
DIARRHEA: 0
COUGH: 0
FEVER: 0
ORTHOPNEA: 0
SORE THROAT: 1
MYALGIAS: 0

## 2024-04-04 NOTE — PROGRESS NOTES
Subjective     Ulysses Coleman is a 78 y.o. male who presents with Other (Sore throat after taking medication /doxyciclin)            HPI  New problem.  Patient is a 78-year-old male who presents with sore throat for the past several weeks.  He was seen in the emergency department on March 15 and diagnosed with pneumonia.  He was prescribed doxycycline which she completed but still continues to have a sore throat.  He does endorse some mild nasal congestion with postnasal drip and states his sore throat is worse in the morning gets better throughout the day and worse again at nighttime.  He has not taken any medications for the symptoms.    Patient has no known allergies.  Current Outpatient Medications on File Prior to Visit   Medication Sig Dispense Refill    ondansetron (ZOFRAN ODT) 4 MG TABLET DISPERSIBLE Take 1 Tablet by mouth every 6 hours as needed for Nausea/Vomiting. 10 Tablet 0    polyethylene glycol/lytes (MIRALAX) Pack Take 1 Packet by mouth every day for 30 days. 30 Packet 0    predniSONE (DELTASONE) 20 MG Tab Take 2 tabs every morning for 5 days 10 Tablet 0    benzonatate (TESSALON) 100 MG Cap Take 1 Capsule by mouth 3 times a day as needed for Cough. 30 Capsule 0    aspirin (ASPIRIN ADULT LOW DOSE) 81 MG EC tablet        No current facility-administered medications on file prior to visit.     Social History     Socioeconomic History    Marital status:      Spouse name: Not on file    Number of children: Not on file    Years of education: Not on file    Highest education level: Not on file   Occupational History    Not on file   Tobacco Use    Smoking status: Never    Smokeless tobacco: Never   Vaping Use    Vaping Use: Never used   Substance and Sexual Activity    Alcohol use: Yes     Comment: occ    Drug use: No    Sexual activity: Not on file   Other Topics Concern    Not on file   Social History Narrative    Not on file     Social Determinants of Health     Financial Resource Strain: Not on  "file   Food Insecurity: Not on file   Transportation Needs: Not on file   Physical Activity: Not on file   Stress: Not on file   Social Connections: Not on file   Intimate Partner Violence: Not on file   Housing Stability: Not on file     Breast Cancer-related family history is not on file.      Review of Systems   Constitutional:  Positive for malaise/fatigue. Negative for chills and fever.   HENT:  Positive for congestion and sore throat.    Eyes:  Negative for discharge.   Respiratory:  Negative for cough.    Cardiovascular:  Negative for chest pain and orthopnea.   Gastrointestinal:  Negative for diarrhea and nausea.   Musculoskeletal:  Negative for myalgias.   Neurological:  Negative for headaches.   Endo/Heme/Allergies:  Negative for environmental allergies.   All other systems reviewed and are negative.             Objective     /80   Pulse (!) 57   Temp 35.9 °C (96.7 °F)   Resp 16   Ht 1.854 m (6' 1\")   Wt 72.6 kg (160 lb)   SpO2 95%   BMI 21.11 kg/m²      Physical Exam  Constitutional:       General: He is not in acute distress.     Appearance: Normal appearance. He is well-developed.   HENT:      Head: Normocephalic and atraumatic.      Right Ear: Tympanic membrane, ear canal and external ear normal. No middle ear effusion. Tympanic membrane is not injected or perforated.      Left Ear: Tympanic membrane, ear canal and external ear normal.  No middle ear effusion. Tympanic membrane is not injected or perforated.      Nose: Mucosal edema and congestion present. No rhinorrhea.      Comments: Mucoid discharge bilaterally     Mouth/Throat:      Pharynx: No oropharyngeal exudate or posterior oropharyngeal erythema.   Eyes:      General:         Right eye: No discharge.         Left eye: No discharge.      Conjunctiva/sclera: Conjunctivae normal.   Cardiovascular:      Rate and Rhythm: Normal rate and regular rhythm.      Pulses: Normal pulses.      Heart sounds: Normal heart sounds. No murmur " heard.  Pulmonary:      Effort: Pulmonary effort is normal. No respiratory distress.      Breath sounds: Normal breath sounds.   Musculoskeletal:         General: Normal range of motion.      Cervical back: Normal range of motion and neck supple.      Comments: Normal movement of all 4 extremities.   Lymphadenopathy:      Cervical: No cervical adenopathy.      Upper Body:      Right upper body: No supraclavicular adenopathy.      Left upper body: No supraclavicular adenopathy.   Skin:     General: Skin is warm and dry.   Neurological:      General: No focal deficit present.      Mental Status: He is alert and oriented to person, place, and time.      Gait: Gait normal.   Psychiatric:         Behavior: Behavior normal.         Thought Content: Thought content normal.                             Assessment & Plan        1. Acute bacterial sinusitis  cefUROXime (CEFTIN) 500 MG Tab      2. Acute pharyngitis, unspecified etiology          Ceftin x five days.  Otc claritin.  Salt water rinses.  Differential diagnosis, natural history, supportive care, and indications for immediate follow-up were discussed.

## 2024-04-16 ENCOUNTER — OFFICE VISIT (OUTPATIENT)
Dept: URGENT CARE | Facility: PHYSICIAN GROUP | Age: 79
End: 2024-04-16
Payer: COMMERCIAL

## 2024-04-16 VITALS
DIASTOLIC BLOOD PRESSURE: 76 MMHG | HEIGHT: 73 IN | OXYGEN SATURATION: 96 % | HEART RATE: 64 BPM | RESPIRATION RATE: 14 BRPM | WEIGHT: 160 LBS | SYSTOLIC BLOOD PRESSURE: 134 MMHG | TEMPERATURE: 97.6 F | BODY MASS INDEX: 21.2 KG/M2

## 2024-04-16 DIAGNOSIS — J01.90 SUBACUTE SINUSITIS, UNSPECIFIED LOCATION: ICD-10-CM

## 2024-04-16 DIAGNOSIS — J02.9 SORE THROAT: ICD-10-CM

## 2024-04-16 DIAGNOSIS — J00 ACUTE RHINITIS: ICD-10-CM

## 2024-04-16 PROCEDURE — 99213 OFFICE O/P EST LOW 20 MIN: CPT | Performed by: NURSE PRACTITIONER

## 2024-04-16 PROCEDURE — 3078F DIAST BP <80 MM HG: CPT | Performed by: NURSE PRACTITIONER

## 2024-04-16 PROCEDURE — 3075F SYST BP GE 130 - 139MM HG: CPT | Performed by: NURSE PRACTITIONER

## 2024-04-16 RX ORDER — FLUTICASONE PROPIONATE 50 MCG
2 SPRAY, SUSPENSION (ML) NASAL DAILY
Qty: 16 G | Refills: 0 | Status: SHIPPED | OUTPATIENT
Start: 2024-04-16 | End: 2024-04-30

## 2024-04-16 RX ORDER — LORATADINE 10 MG/1
10 CAPSULE, LIQUID FILLED ORAL DAILY
Qty: 15 CAPSULE | Refills: 0 | Status: SHIPPED | OUTPATIENT
Start: 2024-04-16

## 2024-04-16 ASSESSMENT — FIBROSIS 4 INDEX: FIB4 SCORE: 1.26

## 2024-04-16 NOTE — PROGRESS NOTES
"Ulysses Coleman is a 78 y.o. male who presents for Cough (For a few months was on antibiotics and still not better)      HPI  This is a new problem. Ulysses Coleman is a 78 y.o. patient who presents to urgent care with c/o:  cough and sore throat. Took antibiotics and not feeling much better.  Sometimes brings up mucus when he is coughing. Taking throat pills - mucinex cough drops. Not taking antihistamine. Gargles with Listerine which helps.  No fever, sob, bodyaches, c/p, NVD. No other aggravating or alleviating factors.       ROS See HPI    Allergies:     No Known Allergies    PMSFS Hx:  No past medical history on file.  No past surgical history on file.  Family History   Problem Relation Age of Onset    Stroke Neg Hx     Heart Disease Neg Hx      Social History     Tobacco Use    Smoking status: Never    Smokeless tobacco: Never   Substance Use Topics    Alcohol use: Yes     Comment: occ       Problems:   There is no problem list on file for this patient.      Medications:   Current Outpatient Medications on File Prior to Visit   Medication Sig Dispense Refill    ondansetron (ZOFRAN ODT) 4 MG TABLET DISPERSIBLE Take 1 Tablet by mouth every 6 hours as needed for Nausea/Vomiting. 10 Tablet 0    aspirin (ASPIRIN ADULT LOW DOSE) 81 MG EC tablet        No current facility-administered medications on file prior to visit.        Objective:     /76   Pulse 64   Temp 36.4 °C (97.6 °F)   Resp 14   Ht 1.854 m (6' 1\")   Wt 72.6 kg (160 lb)   SpO2 96%   BMI 21.11 kg/m²     Physical Exam  Nursing note reviewed.   Constitutional:       General: He is not in acute distress.     Appearance: Normal appearance. He is well-developed and well-groomed. He is not ill-appearing or toxic-appearing.   HENT:      Right Ear: Tympanic membrane, ear canal and external ear normal.      Left Ear: Tympanic membrane, ear canal and external ear normal.      Nose: Rhinorrhea present.      Mouth/Throat:      Mouth: Mucous membranes are " moist.      Pharynx: No posterior oropharyngeal erythema.   Eyes:      General:         Right eye: No discharge.         Left eye: No discharge.      Conjunctiva/sclera: Conjunctivae normal.      Pupils: Pupils are equal, round, and reactive to light.   Cardiovascular:      Rate and Rhythm: Normal rate and regular rhythm.      Pulses: Normal pulses.      Heart sounds: Normal heart sounds.   Pulmonary:      Effort: Pulmonary effort is normal. No accessory muscle usage.      Breath sounds: Normal breath sounds and air entry.   Musculoskeletal:      Cervical back: Neck supple.   Lymphadenopathy:      Upper Body:      Right upper body: No supraclavicular adenopathy.      Left upper body: No supraclavicular adenopathy.   Skin:     General: Skin is warm and dry.      Capillary Refill: Capillary refill takes less than 2 seconds.   Neurological:      Mental Status: He is alert and oriented to person, place, and time.   Psychiatric:         Mood and Affect: Mood normal.         Behavior: Behavior normal.         Assessment /Associated Orders:      1. Acute rhinitis  Loratadine (CLARITIN) 10 MG Cap    fluticasone (FLONASE) 50 MCG/ACT nasal spray      2. Sore throat  Loratadine (CLARITIN) 10 MG Cap    fluticasone (FLONASE) 50 MCG/ACT nasal spray      3. Subacute sinusitis, unspecified location              Medical Decision Making:    Ulysses is a very pleasant 78 y.o. male who is clinically stable at today's acute urgent care visit.  No acute distress noted.  VSS. Appropriate for outpatient care at this time.   Acute problem today with uncertain prognosis. No bacterial infection was identified on exam today. Persistent sinusitis, subacute.   Educated in proper administration of  prescription medication(s) ordered today including safety, possible SE, risks, benefits, rationale and alternatives to therapy.   Keep well hydrated   Cool mist humidifier at night prn   OTC anti-tussive medication of choice to help cough. Dosage and  directions per .     Discussed Dx, management options (risks,benefits, and alternatives to planned treatment), natural progression and supportive care.  Expressed understanding and the treatment plan was agreed upon.   Questions were encouraged and answered   Return to urgent care prn if new or worsening sx or if there is no improvement in condition prn.    Educated in Red flags and indications to immediately call 911 or present to the Emergency Department.         Please note that this dictation was created using voice recognition software. I have worked with consultants from the vendor as well as technical experts from [employer] to optimize the interface. I have made every reasonable attempt to correct obvious errors, but I expect that there are errors of grammar and possibly content that I did not discover before finalizing the note.  This note was electronically signed by provider

## 2024-06-06 ENCOUNTER — OFFICE VISIT (OUTPATIENT)
Dept: URGENT CARE | Facility: PHYSICIAN GROUP | Age: 79
End: 2024-06-06
Payer: COMMERCIAL

## 2024-06-06 VITALS
RESPIRATION RATE: 16 BRPM | OXYGEN SATURATION: 98 % | TEMPERATURE: 97.6 F | DIASTOLIC BLOOD PRESSURE: 70 MMHG | HEART RATE: 63 BPM | HEIGHT: 72 IN | WEIGHT: 157.8 LBS | BODY MASS INDEX: 21.37 KG/M2 | SYSTOLIC BLOOD PRESSURE: 122 MMHG

## 2024-06-06 DIAGNOSIS — R09.82 POSTNASAL DRIP: ICD-10-CM

## 2024-06-06 DIAGNOSIS — M26.609 TMJ DYSFUNCTION: ICD-10-CM

## 2024-06-06 DIAGNOSIS — R59.0 LYMPHADENOPATHY OF HEAD AND NECK REGION: ICD-10-CM

## 2024-06-06 DIAGNOSIS — H92.02 OTALGIA, LEFT: ICD-10-CM

## 2024-06-06 PROCEDURE — 3074F SYST BP LT 130 MM HG: CPT | Performed by: NURSE PRACTITIONER

## 2024-06-06 PROCEDURE — 99214 OFFICE O/P EST MOD 30 MIN: CPT | Performed by: NURSE PRACTITIONER

## 2024-06-06 PROCEDURE — 3078F DIAST BP <80 MM HG: CPT | Performed by: NURSE PRACTITIONER

## 2024-06-06 RX ORDER — PREDNISONE 20 MG/1
40 TABLET ORAL DAILY
Qty: 10 TABLET | Refills: 0 | Status: SHIPPED | OUTPATIENT
Start: 2024-06-06 | End: 2024-06-11

## 2024-06-06 RX ORDER — CETIRIZINE HYDROCHLORIDE 10 MG/1
10 TABLET ORAL DAILY
Qty: 30 TABLET | Refills: 0 | Status: SHIPPED | OUTPATIENT
Start: 2024-06-06

## 2024-06-06 ASSESSMENT — FIBROSIS 4 INDEX: FIB4 SCORE: 1.26

## 2024-06-06 NOTE — PROGRESS NOTES
Subjective:     Ulysses Coleman is a 78 y.o. male who presents for Pharyngitis (On going pain along side of jaw. Nothing helping. )       Pharyngitis   This is a new problem. The problem has been gradually worsening. Associated symptoms include ear pain and neck pain.     Patient reports that at the end of winter, he has had chronic symptoms of postnasal drainage and throat irritation with hoarseness.  Has also had left ear pain with pain in front of the ear and over the left TMJ.  Reports hearing a clicking when opening and closing his mouth.  Also reports pain below the left jaw.    Previously was seen in urgent care in March and April and treated with Claritin, Flonase, and antibiotics for sinusitis and rhinitis.  Currently using throat drops to help with the throat discomfort.    Denies fever or other symptoms.    Review of Systems   Constitutional: Negative.  Negative for chills and fever.   HENT:  Positive for ear pain and sore throat.         PND   Musculoskeletal:  Positive for neck pain.   All other systems reviewed and are negative.    Refer to HPI for additional details.    During this visit, appropriate PPE was worn, and hand hygiene was performed.    PMH:  has no past medical history of Asthma, Cancer ([facility]), Diabetes ([facility]), Hyperlipidemia, or Hypertension.    MEDS:   Current Outpatient Medications:     predniSONE (DELTASONE) 20 MG Tab, Take 2 Tablets by mouth every day for 5 days., Disp: 10 Tablet, Rfl: 0    cetirizine (ZYRTEC) 10 MG Tab, Take 1 Tablet by mouth every day., Disp: 30 Tablet, Rfl: 0    aspirin (ASPIRIN ADULT LOW DOSE) 81 MG EC tablet, , Disp: , Rfl:     ALLERGIES: No Known Allergies  SURGHX: History reviewed. No pertinent surgical history.  SOCHX:  reports that he has never smoked. He has never used smokeless tobacco. He reports current alcohol use. He reports that he does not use drugs.    FH: Per HPI as applicable/pertinent.      Objective:     /70   Pulse 63   Temp 36.4 °C (97.6  °F) (Temporal)   Resp 16   Ht 1.829 m (6')   Wt 71.6 kg (157 lb 12.8 oz)   SpO2 98%   BMI 21.40 kg/m²     Physical Exam  Nursing note reviewed.   Constitutional:       General: He is not in acute distress.     Appearance: He is well-developed. He is not ill-appearing or toxic-appearing.   HENT:      Head:      Jaw: No trismus, tenderness, swelling, pain on movement or malocclusion.      Salivary Glands: Left salivary gland is not diffusely enlarged or tender.      Right Ear: Tympanic membrane normal.      Left Ear: Tympanic membrane, ear canal and external ear normal.      Mouth/Throat:      Comments: PND  Eyes:      General: Vision grossly intact.   Cardiovascular:      Rate and Rhythm: Normal rate.   Pulmonary:      Effort: Pulmonary effort is normal. No respiratory distress.   Musculoskeletal:         General: No deformity. Normal range of motion.      Cervical back: Normal range of motion and neck supple.   Lymphadenopathy:      Head:      Left side of head: Submandibular (TTP) adenopathy present.   Skin:     Coloration: Skin is not pale.   Neurological:      Mental Status: He is alert and oriented to person, place, and time.      Motor: No weakness.   Psychiatric:         Behavior: Behavior normal. Behavior is cooperative.       Assessment/Plan:     1. Postnasal drip  - predniSONE (DELTASONE) 20 MG Tab; Take 2 Tablets by mouth every day for 5 days.  Dispense: 10 Tablet; Refill: 0  - cetirizine (ZYRTEC) 10 MG Tab; Take 1 Tablet by mouth every day.  Dispense: 30 Tablet; Refill: 0    2. Otalgia, left  - predniSONE (DELTASONE) 20 MG Tab; Take 2 Tablets by mouth every day for 5 days.  Dispense: 10 Tablet; Refill: 0  - cetirizine (ZYRTEC) 10 MG Tab; Take 1 Tablet by mouth every day.  Dispense: 30 Tablet; Refill: 0    3. TMJ dysfunction    4. Lymphadenopathy of head and neck region    Rx as above sent electronically.     Monitor. Warning signs reviewed. Return precautions advised.     Differential diagnosis,  natural history, supportive care, over-the-counter symptom management per 's instructions, close monitoring, and indications for immediate follow-up discussed.     All questions answered. Patient agrees with the plan of care.    Discharge summary provided.    Billing note: chronic illness with exacerbation/progression; prescription drug management. Established patient. [id number]. Please refer to LOS tool for details.

## 2024-06-08 ASSESSMENT — ENCOUNTER SYMPTOMS
CONSTITUTIONAL NEGATIVE: 1
CHILLS: 0
FEVER: 0
SORE THROAT: 1
NECK PAIN: 1

## 2024-06-08 ASSESSMENT — VISUAL ACUITY: OU: 1

## 2024-07-10 ENCOUNTER — TELEPHONE (OUTPATIENT)
Dept: HEALTH INFORMATION MANAGEMENT | Facility: OTHER | Age: 79
End: 2024-07-10
Payer: COMMERCIAL

## 2024-08-19 ENCOUNTER — OFFICE VISIT (OUTPATIENT)
Dept: URGENT CARE | Facility: PHYSICIAN GROUP | Age: 79
End: 2024-08-19
Payer: COMMERCIAL

## 2024-08-19 VITALS
TEMPERATURE: 97.5 F | BODY MASS INDEX: 21.26 KG/M2 | HEART RATE: 59 BPM | WEIGHT: 157 LBS | RESPIRATION RATE: 14 BRPM | HEIGHT: 72 IN | DIASTOLIC BLOOD PRESSURE: 60 MMHG | SYSTOLIC BLOOD PRESSURE: 114 MMHG | OXYGEN SATURATION: 98 %

## 2024-08-19 DIAGNOSIS — J02.9 ACUTE PHARYNGITIS, UNSPECIFIED ETIOLOGY: ICD-10-CM

## 2024-08-19 DIAGNOSIS — H66.90 ACUTE OTITIS MEDIA, UNSPECIFIED OTITIS MEDIA TYPE: ICD-10-CM

## 2024-08-19 PROCEDURE — 3074F SYST BP LT 130 MM HG: CPT | Performed by: NURSE PRACTITIONER

## 2024-08-19 PROCEDURE — 99213 OFFICE O/P EST LOW 20 MIN: CPT | Performed by: NURSE PRACTITIONER

## 2024-08-19 PROCEDURE — 3078F DIAST BP <80 MM HG: CPT | Performed by: NURSE PRACTITIONER

## 2024-08-19 ASSESSMENT — FIBROSIS 4 INDEX: FIB4 SCORE: 1.26

## 2024-08-19 NOTE — PROGRESS NOTES
Ulysses Coleman is a 78 y.o. male who presents for Otalgia (Ear ache left, cough, hoarse voice/)      HPI  This is a new problem. Ulysses Coleman is a 78 y.o. patient who presents to urgent care with c/o:1 week of increasing  left ear pain, sore throat on the left side more than then right. Hoarse voice . Hx of ear infections. Tx tried; throat lozenges- help a little . Denies fever but has felt chilled and a little achy. No other aggravating or alleviating factors.  Denies any other concerns at this time.       ROS See HPI    Allergies:     No Known Allergies    PMSFS Hx:  History reviewed. No pertinent past medical history.  History reviewed. No pertinent surgical history.  Family History   Problem Relation Age of Onset    Stroke Neg Hx     Heart Disease Neg Hx      Social History     Tobacco Use    Smoking status: Never    Smokeless tobacco: Never   Substance Use Topics    Alcohol use: Yes     Comment: occ       Problems:   There is no problem list on file for this patient.      Medications:   Current Outpatient Medications on File Prior to Visit   Medication Sig Dispense Refill    aspirin (ASPIRIN ADULT LOW DOSE) 81 MG EC tablet       cetirizine (ZYRTEC) 10 MG Tab Take 1 Tablet by mouth every day. (Patient not taking: Reported on 8/19/2024) 30 Tablet 0     No current facility-administered medications on file prior to visit.        Objective:     /60   Pulse (!) 59   Temp 36.4 °C (97.5 °F) (Temporal)   Resp 14   Ht 1.829 m (6')   Wt 71.2 kg (157 lb)   SpO2 98%   BMI 21.29 kg/m²     Physical Exam  Vitals and nursing note reviewed.   Constitutional:       Appearance: Normal appearance. He is normal weight. He is not ill-appearing.   HENT:      Right Ear: Tympanic membrane, ear canal and external ear normal.      Left Ear: Ear canal and external ear normal. Tenderness present. No swelling. A middle ear effusion is present. There is no impacted cerumen. Tympanic membrane is erythematous.       Mouth/Throat:      Mouth: Mucous membranes are moist.      Pharynx: Uvula midline. Posterior oropharyngeal erythema present. No oropharyngeal exudate or uvula swelling.   Eyes:      Conjunctiva/sclera: Conjunctivae normal.   Cardiovascular:      Rate and Rhythm: Normal rate and regular rhythm.      Pulses: Normal pulses.      Heart sounds: Normal heart sounds.   Pulmonary:      Effort: Pulmonary effort is normal.      Breath sounds: Normal breath sounds.   Skin:     General: Skin is warm and dry.      Capillary Refill: Capillary refill takes less than 2 seconds.   Neurological:      Mental Status: He is oriented to person, place, and time.   Psychiatric:         Mood and Affect: Mood normal.         Behavior: Behavior normal.         Thought Content: Thought content normal.         Assessment /Associated Orders:      1. Acute otitis media, unspecified otitis media type  amoxicillin-clavulanate (AUGMENTIN) 875-125 MG Tab      2. Acute pharyngitis, unspecified etiology  amoxicillin-clavulanate (AUGMENTIN) 875-125 MG Tab          Medical Decision Making:    Ulysses is a very pleasant 78 y.o. male who is clinically stable at today's acute urgent care visit.  No acute distress noted.  VSS. Appropriate for outpatient care at this time.   Acute problem today with uncertain prognosis.   Educated in proper administration of  prescription medication(s) ordered today including safety, possible SE, risks, benefits, rationale and alternatives to therapy.   Warm pack to external ear prn pain.   OTC  analgesic of choice (acetaminophen or NSAID) prn pain. Follow manufactures dosing and safety precautions.   Salt water gargles BID and prn. Suggested 1/4 to 1/2 teaspoon (1.5 to 3.0 g) of salt per one cup (8 ounces or 250 mL) of warm water.   OTC throat analgesic spray or lozenge of choice prn throat pain. Dosage and directions per   OTC  analgesic of choice (acetaminophen or NSAID) prn pain. Follow manufactures dosing and  safety precautions.   Stay well hydrated.   Educated in infection control practices.     Discussed Dx, management options (risks,benefits, and alternatives to planned treatment), natural progression and supportive care.  Expressed understanding and the treatment plan was agreed upon.   Questions were encouraged and answered   Return to urgent care prn if new or worsening sx or if there is no improvement in condition prn.    Educated in Red flags and indications to immediately call 911 or present to the Emergency Department.           Please note that this dictation was created using voice recognition software. I have worked with consultants from the vendor as well as technical experts from [facility] to optimize the interface. I have made every reasonable attempt to correct obvious errors, but I expect that there are errors of grammar and possibly content that I did not discover before finalizing the note.  This note was electronically signed by provider

## 2024-10-03 ENCOUNTER — APPOINTMENT (OUTPATIENT)
Dept: MEDICAL GROUP | Facility: PHYSICIAN GROUP | Age: 79
End: 2024-10-03
Payer: COMMERCIAL

## 2024-10-03 VITALS
TEMPERATURE: 97.5 F | HEART RATE: 62 BPM | WEIGHT: 161.31 LBS | SYSTOLIC BLOOD PRESSURE: 120 MMHG | RESPIRATION RATE: 20 BRPM | BODY MASS INDEX: 21.85 KG/M2 | OXYGEN SATURATION: 100 % | DIASTOLIC BLOOD PRESSURE: 58 MMHG | HEIGHT: 72 IN

## 2024-10-03 DIAGNOSIS — Z13.29 SCREENING FOR ENDOCRINE, METABOLIC AND IMMUNITY DISORDER: ICD-10-CM

## 2024-10-03 DIAGNOSIS — K40.90 RIGHT INGUINAL HERNIA: ICD-10-CM

## 2024-10-03 DIAGNOSIS — E55.9 VITAMIN D DEFICIENCY: ICD-10-CM

## 2024-10-03 DIAGNOSIS — Z13.228 SCREENING FOR ENDOCRINE, METABOLIC AND IMMUNITY DISORDER: ICD-10-CM

## 2024-10-03 DIAGNOSIS — Z13.6 SCREENING FOR CARDIOVASCULAR CONDITION: ICD-10-CM

## 2024-10-03 DIAGNOSIS — Z13.0 SCREENING FOR ENDOCRINE, METABOLIC AND IMMUNITY DISORDER: ICD-10-CM

## 2024-10-03 DIAGNOSIS — Z23 NEED FOR VACCINATION: ICD-10-CM

## 2024-10-03 PROCEDURE — 90662 IIV NO PRSV INCREASED AG IM: CPT

## 2024-10-03 PROCEDURE — 90471 IMMUNIZATION ADMIN: CPT

## 2024-10-03 PROCEDURE — 99213 OFFICE O/P EST LOW 20 MIN: CPT | Mod: 25

## 2024-10-03 PROCEDURE — 3078F DIAST BP <80 MM HG: CPT

## 2024-10-03 PROCEDURE — 3074F SYST BP LT 130 MM HG: CPT

## 2024-10-03 ASSESSMENT — PATIENT HEALTH QUESTIONNAIRE - PHQ9: CLINICAL INTERPRETATION OF PHQ2 SCORE: 0

## 2024-10-03 ASSESSMENT — FIBROSIS 4 INDEX: FIB4 SCORE: 1.26

## 2024-10-22 ENCOUNTER — APPOINTMENT (OUTPATIENT)
Dept: LAB | Facility: MEDICAL CENTER | Age: 79
End: 2024-10-22
Payer: COMMERCIAL

## 2024-10-22 ENCOUNTER — HOSPITAL ENCOUNTER (OUTPATIENT)
Dept: LAB | Facility: MEDICAL CENTER | Age: 79
End: 2024-10-22
Payer: COMMERCIAL

## 2024-10-22 DIAGNOSIS — Z13.0 SCREENING FOR ENDOCRINE, METABOLIC AND IMMUNITY DISORDER: ICD-10-CM

## 2024-10-22 DIAGNOSIS — Z13.228 SCREENING FOR ENDOCRINE, METABOLIC AND IMMUNITY DISORDER: ICD-10-CM

## 2024-10-22 DIAGNOSIS — Z13.29 SCREENING FOR ENDOCRINE, METABOLIC AND IMMUNITY DISORDER: ICD-10-CM

## 2024-10-22 DIAGNOSIS — E55.9 VITAMIN D DEFICIENCY: ICD-10-CM

## 2024-10-22 DIAGNOSIS — Z13.6 SCREENING FOR CARDIOVASCULAR CONDITION: ICD-10-CM

## 2024-10-22 LAB
BASOPHILS # BLD AUTO: 1.2 % (ref 0–1.8)
BASOPHILS # BLD: 0.04 K/UL (ref 0–0.12)
EOSINOPHIL # BLD AUTO: 0.06 K/UL (ref 0–0.51)
EOSINOPHIL NFR BLD: 1.8 % (ref 0–6.9)
ERYTHROCYTE [DISTWIDTH] IN BLOOD BY AUTOMATED COUNT: 47.1 FL (ref 35.9–50)
EST. AVERAGE GLUCOSE BLD GHB EST-MCNC: 126 MG/DL
HBA1C MFR BLD: 6 % (ref 4–5.6)
HCT VFR BLD AUTO: 39.7 % (ref 42–52)
HGB BLD-MCNC: 12.9 G/DL (ref 14–18)
IMM GRANULOCYTES # BLD AUTO: 0.02 K/UL (ref 0–0.11)
IMM GRANULOCYTES NFR BLD AUTO: 0.6 % (ref 0–0.9)
LYMPHOCYTES # BLD AUTO: 1.12 K/UL (ref 1–4.8)
LYMPHOCYTES NFR BLD: 33.4 % (ref 22–41)
MCH RBC QN AUTO: 30 PG (ref 27–33)
MCHC RBC AUTO-ENTMCNC: 32.5 G/DL (ref 32.3–36.5)
MCV RBC AUTO: 92.3 FL (ref 81.4–97.8)
MONOCYTES # BLD AUTO: 0.59 K/UL (ref 0–0.85)
MONOCYTES NFR BLD AUTO: 17.6 % (ref 0–13.4)
NEUTROPHILS # BLD AUTO: 1.52 K/UL (ref 1.82–7.42)
NEUTROPHILS NFR BLD: 45.4 % (ref 44–72)
NRBC # BLD AUTO: 0 K/UL
NRBC BLD-RTO: 0 /100 WBC (ref 0–0.2)
PLATELET # BLD AUTO: 158 K/UL (ref 164–446)
PMV BLD AUTO: 11.8 FL (ref 9–12.9)
RBC # BLD AUTO: 4.3 M/UL (ref 4.7–6.1)
WBC # BLD AUTO: 3.4 K/UL (ref 4.8–10.8)

## 2024-10-22 PROCEDURE — 36415 COLL VENOUS BLD VENIPUNCTURE: CPT | Mod: GA

## 2024-10-22 PROCEDURE — 84443 ASSAY THYROID STIM HORMONE: CPT | Mod: GA

## 2024-10-22 PROCEDURE — 82306 VITAMIN D 25 HYDROXY: CPT

## 2024-10-22 PROCEDURE — 85025 COMPLETE CBC W/AUTO DIFF WBC: CPT

## 2024-10-22 PROCEDURE — 83036 HEMOGLOBIN GLYCOSYLATED A1C: CPT | Mod: GA

## 2024-10-22 PROCEDURE — 80053 COMPREHEN METABOLIC PANEL: CPT

## 2024-10-22 PROCEDURE — 80061 LIPID PANEL: CPT

## 2024-10-23 LAB
25(OH)D3 SERPL-MCNC: 41 NG/ML (ref 30–100)
ALBUMIN SERPL BCP-MCNC: 3.9 G/DL (ref 3.2–4.9)
ALBUMIN/GLOB SERPL: 1.4 G/DL
ALP SERPL-CCNC: 60 U/L (ref 30–99)
ALT SERPL-CCNC: 22 U/L (ref 2–50)
ANION GAP SERPL CALC-SCNC: 10 MMOL/L (ref 7–16)
AST SERPL-CCNC: 25 U/L (ref 12–45)
BILIRUB SERPL-MCNC: 0.8 MG/DL (ref 0.1–1.5)
BUN SERPL-MCNC: 19 MG/DL (ref 8–22)
CALCIUM ALBUM COR SERPL-MCNC: 9.2 MG/DL (ref 8.5–10.5)
CALCIUM SERPL-MCNC: 9.1 MG/DL (ref 8.5–10.5)
CHLORIDE SERPL-SCNC: 106 MMOL/L (ref 96–112)
CHOLEST SERPL-MCNC: 156 MG/DL (ref 100–199)
CO2 SERPL-SCNC: 26 MMOL/L (ref 20–33)
CREAT SERPL-MCNC: 0.69 MG/DL (ref 0.5–1.4)
GFR SERPLBLD CREATININE-BSD FMLA CKD-EPI: 94 ML/MIN/1.73 M 2
GLOBULIN SER CALC-MCNC: 2.7 G/DL (ref 1.9–3.5)
GLUCOSE SERPL-MCNC: 86 MG/DL (ref 65–99)
HDLC SERPL-MCNC: 79 MG/DL
LDLC SERPL CALC-MCNC: 69 MG/DL
POTASSIUM SERPL-SCNC: 4.5 MMOL/L (ref 3.6–5.5)
PROT SERPL-MCNC: 6.6 G/DL (ref 6–8.2)
SODIUM SERPL-SCNC: 142 MMOL/L (ref 135–145)
TRIGL SERPL-MCNC: 38 MG/DL (ref 0–149)
TSH SERPL DL<=0.005 MIU/L-ACNC: 1.73 UIU/ML (ref 0.38–5.33)

## 2024-12-09 ENCOUNTER — OFFICE VISIT (OUTPATIENT)
Dept: SURGICAL ONCOLOGY | Facility: MEDICAL CENTER | Age: 79
End: 2024-12-09
Payer: COMMERCIAL

## 2024-12-09 VITALS
WEIGHT: 157.8 LBS | BODY MASS INDEX: 21.37 KG/M2 | HEART RATE: 55 BPM | OXYGEN SATURATION: 97 % | DIASTOLIC BLOOD PRESSURE: 70 MMHG | TEMPERATURE: 97.7 F | SYSTOLIC BLOOD PRESSURE: 130 MMHG | HEIGHT: 72 IN

## 2024-12-09 DIAGNOSIS — K40.90 NON-RECURRENT UNILATERAL INGUINAL HERNIA WITHOUT OBSTRUCTION OR GANGRENE: ICD-10-CM

## 2024-12-09 PROCEDURE — 3078F DIAST BP <80 MM HG: CPT | Performed by: SURGERY

## 2024-12-09 PROCEDURE — 3075F SYST BP GE 130 - 139MM HG: CPT | Performed by: SURGERY

## 2024-12-09 PROCEDURE — 99213 OFFICE O/P EST LOW 20 MIN: CPT | Performed by: SURGERY

## 2024-12-09 ASSESSMENT — FIBROSIS 4 INDEX: FIB4 SCORE: 2.7

## 2024-12-09 NOTE — PATIENT INSTRUCTIONS
No Eating after midnight the night before surgery.  After surgery, you will be discharged home.  You will need a ride home from the hospital.  You may eat and drink a normal diet after surgery.  It is ok to go for walks and go up and down stairs.  NO LIFTING, MOVING OR CARRYING MORE THAN 10LBS for 6 weeks post-op.  Ok to shower with wounds uncovered after surgery.  You may return to work 5-7 days after surgery on light duty.  No lifting or straining as above.

## 2024-12-09 NOTE — PROGRESS NOTES
Subjective:   12/9/2024 10:23 AM  Primary care physician:MONET Gonzalez        Chief Complaint:   Chief Complaint   Patient presents with    New Patient     NP - Possible Right inguinal hernia        History of presenting illness:  Ulysses Coleman  is a pleasant 79 y.o. year old male who presented with a bulge in the right groin which has bene present for years now.  He reports that the bulge will push in and out, he denies any current GI issues, but has enough discomfort from the hernia that he is interested in surgical repair.  He has not had any prior abdominal surgery and does not take any blood thinners besides baby Asprin at this time.          History reviewed. No pertinent past medical history.  History reviewed. No pertinent surgical history.  No Known Allergies  Current Outpatient Medications   Medication Sig    multivitamin Tab Take 1 Tablet by mouth every day.    aspirin (ASPIRIN ADULT LOW DOSE) 81 MG EC tablet      Social History     Socioeconomic History    Marital status:      Spouse name: Not on file    Number of children: Not on file    Years of education: Not on file    Highest education level: Not on file   Occupational History    Not on file   Tobacco Use    Smoking status: Never    Smokeless tobacco: Never   Vaping Use    Vaping status: Never Used   Substance and Sexual Activity    Alcohol use: Yes     Comment: occ    Drug use: No    Sexual activity: Not on file   Other Topics Concern    Not on file   Social History Narrative    Not on file     Social Drivers of Health     Financial Resource Strain: Not on file   Food Insecurity: Not on file   Transportation Needs: Not on file   Physical Activity: Not on file   Stress: Not on file   Social Connections: Not on file   Intimate Partner Violence: Not on file   Housing Stability: Not on file      Family History   Problem Relation Age of Onset    Stroke Neg Hx     Heart Disease Neg Hx        ROS: Negative except as detailed in  HPI.     Objective:   /70 (BP Location: Left arm, Patient Position: Sitting, BP Cuff Size: Adult)   Pulse (!) 55   Temp 36.5 °C (97.7 °F) (Temporal)   Ht 1.829 m (6')   Wt 71.6 kg (157 lb 12.8 oz)   SpO2 97%   BMI 21.40 kg/m²     Physical Exam:  Constitutional: Well-developed and well-nourished. Not diaphoretic. No distress.   Skin: Skin is warm and dry. No rash noted.  Head: Atraumatic without lesions.  Eyes: Conjunctivae and extraocular motions are normal. No scleral icterus.   Nose: Nares patent. Septum midline. No discharge.   Neck: Supple, trachea midline. Normal range of motion. No thyromegaly present. No lymphadenopathy--cervical or supraclavicular.  Cardiovascular: Regular rate and rhythm, 2+ pulses   Lungs: Effort normal. No chest wall masses.   Abdomen: Soft, non tender, and without distention. No rebound, guarding, masses.  Extremities: Warm and well perfused, no pitting edema  Musculoskeletal: All major joints AROM full in all directions without pain.  Neurological: Alert and oriented x 3.   Psychiatric:  Behavior, mood, and affect are appropriate.        Diagnosis:     Assessment & Plan  Non-recurrent unilateral inguinal hernia without obstruction or gangrene               Medical Decision Making:  Today's Assessment / Status / Plan:     79y M with reducible right inguinal hernia present.  Discussed elective surgical repair with him including need to avoid post-op lifting and risks of recurrence.  Risks/benefits/alternatives of planned surgery were discussed with the patient.  They understand issues involved and agree with the surgical treatment plan.  Will arrange for surgery at the next available time.    Robotic R IHR with [employer]

## 2024-12-18 ENCOUNTER — OFFICE VISIT (OUTPATIENT)
Dept: URGENT CARE | Facility: PHYSICIAN GROUP | Age: 79
End: 2024-12-18
Payer: COMMERCIAL

## 2024-12-18 VITALS
DIASTOLIC BLOOD PRESSURE: 74 MMHG | HEART RATE: 56 BPM | SYSTOLIC BLOOD PRESSURE: 136 MMHG | TEMPERATURE: 97.6 F | HEIGHT: 72 IN | RESPIRATION RATE: 16 BRPM | OXYGEN SATURATION: 99 % | BODY MASS INDEX: 21.44 KG/M2 | WEIGHT: 158.3 LBS

## 2024-12-18 DIAGNOSIS — J22 LRTI (LOWER RESPIRATORY TRACT INFECTION): ICD-10-CM

## 2024-12-18 PROCEDURE — 3078F DIAST BP <80 MM HG: CPT

## 2024-12-18 PROCEDURE — 3075F SYST BP GE 130 - 139MM HG: CPT

## 2024-12-18 PROCEDURE — 99213 OFFICE O/P EST LOW 20 MIN: CPT

## 2024-12-18 RX ORDER — DOXYCYCLINE HYCLATE 100 MG
100 TABLET ORAL 2 TIMES DAILY
Qty: 14 TABLET | Refills: 0 | Status: SHIPPED | OUTPATIENT
Start: 2024-12-18 | End: 2024-12-25

## 2024-12-18 RX ORDER — BENZONATATE 100 MG/1
100 CAPSULE ORAL 3 TIMES DAILY PRN
Qty: 60 CAPSULE | Refills: 0 | Status: SHIPPED | OUTPATIENT
Start: 2024-12-18

## 2024-12-18 RX ORDER — ALBUTEROL SULFATE 90 UG/1
2 INHALANT RESPIRATORY (INHALATION) EVERY 4 HOURS PRN
Qty: 1 EACH | Refills: 0 | Status: SHIPPED | OUTPATIENT
Start: 2024-12-18

## 2024-12-18 RX ORDER — PREDNISONE 10 MG/1
20 TABLET ORAL DAILY
Qty: 10 TABLET | Refills: 0 | Status: SHIPPED | OUTPATIENT
Start: 2024-12-18 | End: 2024-12-23

## 2024-12-18 ASSESSMENT — FIBROSIS 4 INDEX: FIB4 SCORE: 2.7

## 2024-12-19 NOTE — PROGRESS NOTES
Subjective:   Ulysses Coleman is a 79 y.o. male who presents for Other (Chest cold x 3 weeks)      HPI:    Patient presents to urgent care with concerns of rhinorrhea, nasal congestion, sore throat, cough.    Onset was three weeks ago  Reports wheezing, chest tightness, SOB. He is employed at a [employer] and endorses second hand smoke exposure. Denies history of asthma, copd.   Denies fever, chills, body aches  Denies known sick contacts  Mild improvement with Dayquil, Nyquil.  Tolerating solids, fluids. Endorses normal urinary output.   Denies fatigue      ROS As above in HPI    Medications:    Current Outpatient Medications on File Prior to Visit   Medication Sig Dispense Refill    multivitamin Tab Take 1 Tablet by mouth every day.      aspirin (ASPIRIN ADULT LOW DOSE) 81 MG EC tablet        No current facility-administered medications on file prior to visit.        Allergies:   Patient has no known allergies.    Problem List:   Patient Active Problem List   Diagnosis    Right inguinal hernia        Surgical History:  No past surgical history on file.    Past Social Hx:   Social History     Tobacco Use    Smoking status: Never    Smokeless tobacco: Never   Vaping Use    Vaping status: Never Used   Substance Use Topics    Alcohol use: Yes     Comment: occ    Drug use: No          Problem list, medications, and allergies reviewed by myself today in Epic.     Objective:     /74   Pulse (!) 56   Temp 36.4 °C (97.6 °F)   Resp 16   Ht 1.829 m (6')   Wt 71.8 kg (158 lb 4.8 oz)   SpO2 99%   BMI 21.47 kg/m²     Physical Exam  Vitals and nursing note reviewed.   Constitutional:       General: He is not in acute distress.     Appearance: Normal appearance. He is not ill-appearing or diaphoretic.   HENT:      Head: Normocephalic.      Right Ear: Tympanic membrane and ear canal normal.      Left Ear: Tympanic membrane and ear canal normal.      Nose: Rhinorrhea present. No congestion.      Right Sinus: No maxillary  sinus tenderness or frontal sinus tenderness.      Left Sinus: No maxillary sinus tenderness or frontal sinus tenderness.      Mouth/Throat:      Mouth: Mucous membranes are moist.      Pharynx: Oropharynx is clear. Posterior oropharyngeal erythema and postnasal drip present.      Tonsils: No tonsillar exudate.   Cardiovascular:      Rate and Rhythm: Normal rate and regular rhythm.      Heart sounds: Normal heart sounds. No murmur heard.     No friction rub. No gallop.   Pulmonary:      Effort: Pulmonary effort is normal. No respiratory distress.      Breath sounds: Normal breath sounds. No stridor. No wheezing, rhonchi or rales.   Chest:      Chest wall: No tenderness.   Abdominal:      General: Abdomen is flat. Bowel sounds are normal.      Palpations: Abdomen is soft.   Musculoskeletal:      Cervical back: No rigidity or tenderness.   Lymphadenopathy:      Cervical: No cervical adenopathy.   Skin:     General: Skin is warm and dry.      Capillary Refill: Capillary refill takes less than 2 seconds.      Findings: No rash.   Neurological:      Mental Status: He is alert and oriented to person, place, and time.         Assessment/Plan:       Diagnosis and associated orders:   1. LRTI (lower respiratory tract infection)  - albuterol 108 (90 Base) MCG/ACT Aero Soln inhalation aerosol; Inhale 2 Puffs every four hours as needed for Shortness of Breath.  Dispense: 1 Each; Refill: 0  - benzonatate (TESSALON) 100 MG Cap; Take 1 Capsule by mouth 3 times a day as needed for Cough.  Dispense: 60 Capsule; Refill: 0  - predniSONE (DELTASONE) 10 MG Tab; Take 2 Tablets by mouth every day for 5 days.  Dispense: 10 Tablet; Refill: 0  - doxycycline (VIBRAMYCIN) 100 MG Tab; Take 1 Tablet by mouth 2 times a day for 7 days.  Dispense: 14 Tablet; Refill: 0        Comments/MDM:     Patient presents to urgent care with concerns of three weeks of cough which started after cold-like symptoms.   Vital signs are stable, patient is nontoxic.  No signs of respiratory distress. He declined imaging today. 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   Supportive measures encouraged: Rest, increased oral hydration, NSAIDs/tylenol as needed per package instructions, decongestant, warm humidification, otc antihistamines, Flonase, cough suppressant as needed   Strict return to ER precautions reviewed  Follow-up with primary care advised should symptoms fail to improve  Work note declined       Return to clinic or go to ED if symptoms worsen or persist. Indications for ED discussed at length. Patient/Parent/Guardian voices understanding. Follow-up with your primary care provider in 3-5 days. Red flag symptoms discussed. All side effects of medication discussed including allergic response, GI upset, tendon injury, rash, sedation etc.    Please note that this dictation was created using voice recognition software. I have made a reasonable attempt to correct obvious errors, but I expect that there are errors of grammar and possibly content that I did not discover before finalizing the note.    This note was electronically signed by DENISA Guzman

## 2025-01-13 ENCOUNTER — OFFICE VISIT (OUTPATIENT)
Dept: URGENT CARE | Facility: PHYSICIAN GROUP | Age: 80
End: 2025-01-13
Payer: COMMERCIAL

## 2025-01-13 VITALS
TEMPERATURE: 98.1 F | SYSTOLIC BLOOD PRESSURE: 136 MMHG | BODY MASS INDEX: 21.94 KG/M2 | HEIGHT: 72 IN | HEART RATE: 55 BPM | WEIGHT: 162 LBS | RESPIRATION RATE: 14 BRPM | DIASTOLIC BLOOD PRESSURE: 66 MMHG | OXYGEN SATURATION: 99 %

## 2025-01-13 DIAGNOSIS — S86.911A STRAIN OF RIGHT KNEE, INITIAL ENCOUNTER: ICD-10-CM

## 2025-01-13 DIAGNOSIS — J22 LOWER RESPIRATORY TRACT INFECTION: ICD-10-CM

## 2025-01-13 PROCEDURE — 3078F DIAST BP <80 MM HG: CPT | Performed by: NURSE PRACTITIONER

## 2025-01-13 PROCEDURE — 99214 OFFICE O/P EST MOD 30 MIN: CPT | Performed by: NURSE PRACTITIONER

## 2025-01-13 PROCEDURE — 3075F SYST BP GE 130 - 139MM HG: CPT | Performed by: NURSE PRACTITIONER

## 2025-01-13 ASSESSMENT — FIBROSIS 4 INDEX: FIB4 SCORE: 2.7

## 2025-01-18 NOTE — PROGRESS NOTES
Verbal consent was acquired by the patient to use [employer] ambient listening note generation during this visit          Chief Complaint   Patient presents with    Knee Injury     X 1 mth Rt knee injury working out in gym          History of Present Illness  The patient is an 79-year-old male who presents for evaluation of knee pain, cough, and tinnitus.    He reports experiencing persistent knee pain for the past month, which he attributes to a recent fall. Despite not engaging in regular exercise, he finds relief from the pain through the use of a knee brace. He expresses interest in receiving another cortisone injection for his current knee pain. He also mentions that he has noticed enlarged veins in his ears since receiving the cortisone injection, a change he had not observed prior to the treatment. He has been managing the pain with a knee brace. He has been taking fish oil supplements, which have been beneficial for his shoulder condition. He recalls receiving a cortisone injection several years ago, which was effective in alleviating his shoulder pain. He also notes that he was an avid runner during his younger years.    He continues to experience mucus production, particularly when at work, which triggers coughing and results in hoarseness. These symptoms have been persistent for an extended period. He reports that the mucus is predominantly present on the left side. He also reports a sore throat and swollen gland on the same side. He does not report any allergies to antibiotics. He also reports nasal congestion and a runny nose when eating but does not have any known allergies. He was previously prescribed medication for his cough and an antibiotic, both of which he completed and found beneficial. He has been using cough drops to manage his symptoms, which he finds helpful.    He has been experiencing tinnitus since his first cortisone injection.    ALLERGIES  The patient has no known  allergies.    MEDICATIONS  Current: fish oil         ROS:    No severe shortness of breath   No cardiac like chest pain, as discussed   As otherwise stated in HPI    Medical/SX/ Social History:  Reviewed per chart    Pertinent Medications:    Current Outpatient Medications on File Prior to Visit   Medication Sig Dispense Refill    multivitamin Tab Take 1 Tablet by mouth every day.      aspirin (ASPIRIN ADULT LOW DOSE) 81 MG EC tablet       albuterol 108 (90 Base) MCG/ACT Aero Soln inhalation aerosol Inhale 2 Puffs every four hours as needed for Shortness of Breath. (Patient not taking: Reported on 1/13/2025) 1 Each 0    benzonatate (TESSALON) 100 MG Cap Take 1 Capsule by mouth 3 times a day as needed for Cough. (Patient not taking: Reported on 1/13/2025) 60 Capsule 0     No current facility-administered medications on file prior to visit.        Allergies:    Patient has no known allergies.     Problem list, medications, and allergies reviewed by myself today in Epic     Physical Exam:    Vitals:    01/13/25 1626   BP: 136/66   Pulse:    Resp:    Temp:    SpO2:              Physical Exam  Vitals and nursing note reviewed.   Constitutional:       General: He is not in acute distress.     Appearance: Normal appearance. He is not ill-appearing or toxic-appearing.   HENT:      Head: Normocephalic and atraumatic.      Right Ear: Tympanic membrane, ear canal and external ear normal.      Left Ear: Tympanic membrane, ear canal and external ear normal.      Nose: Nose normal. No congestion or rhinorrhea.      Mouth/Throat:      Mouth: Mucous membranes are moist.      Pharynx: Oropharynx is clear. No posterior oropharyngeal erythema.   Eyes:      Extraocular Movements: Extraocular movements intact.      Conjunctiva/sclera: Conjunctivae normal.      Pupils: Pupils are equal, round, and reactive to light.   Cardiovascular:      Rate and Rhythm: Normal rate and regular rhythm.      Pulses: Normal pulses.      Heart sounds:  Normal heart sounds.   Pulmonary:      Effort: Pulmonary effort is normal.      Breath sounds: Wheezing present. No rhonchi or rales.   Musculoskeletal:      Cervical back: Normal range of motion and neck supple.      Right knee: Swelling and bony tenderness present. No deformity, effusion, erythema, ecchymosis or lacerations. Decreased range of motion. Tenderness present over the lateral joint line. No medial joint line tenderness.        Legs:    Skin:     General: Skin is warm.      Capillary Refill: Capillary refill takes less than 2 seconds.   Neurological:      General: No focal deficit present.      Mental Status: He is alert and oriented to person, place, and time.        Medical Decision making and plan :  I personally reviewed prior external notes and test results pertinent to today's visit. Pt is clinically stable at today's acute urgent care visit.  Patient appears nontoxic with no acute distress noted. Appropriate for outpatient care at this time.    Pleasant 79 y.o. male presented clinic with:     Assessment & Plan  1. Right knee pain.   He reports knee pain lasting about a month, which is alleviated by wearing a brace. He has a history of receiving a cortisone shot years ago and is interested in another one. A referral to the sports medicine group will be initiated for further evaluation and potential administration of a cortisone injection.  He will continue the brace and antiinflammatories as needed.     2. LRTI.  Due to duration of symptoms and failure of OTC therapies, antibiotic was written for treatment of bacterial etiology for lower respiratory tract infection. Continue OTC supportive therapies. Flonase, OTC allergy meds, avoid night time dairy, increased fluids and humidification recommended. Patient given precautionary s/sx that mandate immediate follow up and evaluation in the ED. Advised of risks of not doing so. DDX, Supportive care, and indications for immediate follow-up discussed with  patient.  Instructed to return to clinic or nearest emergency department if we are not available for any change in condition, further concerns, or worsening of symptoms.    The patient demonstrated a good understanding and agreed with the treatment plan        Shared decision-making was utilized with patient for treatment plan. Medication discussed included indication for use and the potential benefits and side effects. Education was provided regarding the aforementioned assessments.  Differential Diagnosis, natural history, and supportive care discussed. All of the patient's questions were answered to their satisfaction at the time of discharge. Patient was encouraged to monitor symptoms closely. Those signs and symptoms which would warrant concern and mandate seeking a higher level of service through the emergency department discussed at length.  Patient stated agreement and understanding of this plan of care.    Disposition:  Home in stable condition     Voice Recognition Disclaimer:  Portions of this document were created using voice recognition software and [employer] technology provided by [facility]. The software does have a chance of producing errors of grammar and possibly content. I have made every reasonable attempt to correct obvious errors, but there may be errors of grammar and possibly content that I did not discover before finalizing the  documentation.    YOVANY Treviño.

## 2025-07-14 ENCOUNTER — OFFICE VISIT (OUTPATIENT)
Dept: URGENT CARE | Facility: PHYSICIAN GROUP | Age: 80
End: 2025-07-14
Payer: COMMERCIAL

## 2025-07-14 ENCOUNTER — HOSPITAL ENCOUNTER (OUTPATIENT)
Dept: LAB | Facility: MEDICAL CENTER | Age: 80
End: 2025-07-14
Attending: FAMILY MEDICINE
Payer: COMMERCIAL

## 2025-07-14 VITALS
DIASTOLIC BLOOD PRESSURE: 62 MMHG | SYSTOLIC BLOOD PRESSURE: 140 MMHG | WEIGHT: 161 LBS | TEMPERATURE: 97 F | BODY MASS INDEX: 21.81 KG/M2 | RESPIRATION RATE: 15 BRPM | OXYGEN SATURATION: 99 % | HEIGHT: 72 IN | HEART RATE: 59 BPM

## 2025-07-14 DIAGNOSIS — E04.1 THYROID NODULE GREATER THAN OR EQUAL TO 1 CM IN DIAMETER INCIDENTALLY NOTED ON IMAGING STUDY: ICD-10-CM

## 2025-07-14 DIAGNOSIS — R22.1 NECK SWELLING: ICD-10-CM

## 2025-07-14 DIAGNOSIS — R22.1 NECK SWELLING: Primary | ICD-10-CM

## 2025-07-14 LAB
CREAT SERPL-MCNC: 0.83 MG/DL (ref 0.5–1.4)
GFR SERPLBLD CREATININE-BSD FMLA CKD-EPI: 88 ML/MIN/1.73 M 2

## 2025-07-14 PROCEDURE — 3078F DIAST BP <80 MM HG: CPT | Performed by: FAMILY MEDICINE

## 2025-07-14 PROCEDURE — 3077F SYST BP >= 140 MM HG: CPT | Performed by: FAMILY MEDICINE

## 2025-07-14 PROCEDURE — 36415 COLL VENOUS BLD VENIPUNCTURE: CPT

## 2025-07-14 PROCEDURE — 82565 ASSAY OF CREATININE: CPT

## 2025-07-14 PROCEDURE — 99214 OFFICE O/P EST MOD 30 MIN: CPT | Performed by: FAMILY MEDICINE

## 2025-07-14 ASSESSMENT — ENCOUNTER SYMPTOMS
VOMITING: 0
WEIGHT LOSS: 0
NAUSEA: 0
EYE REDNESS: 0
EYE DISCHARGE: 0
MYALGIAS: 0

## 2025-07-14 ASSESSMENT — FIBROSIS 4 INDEX: FIB4 SCORE: 2.7

## 2025-07-14 NOTE — PROGRESS NOTES
Subjective     Ulysses Coleman is a 79 y.o. male who presents with Pain (Tonsil pain, left side bottom. X 1 month. )            1 month mild anterior neck pain. Mild swelling. No fever. No dysphagia. Never a smoker but has worked for years in the [employer] environment with passive tobacco smoke exposure. He is concerned about possible cancer. Weight stable. No other aggravating or alleviating factors.            Review of Systems   Constitutional:  Negative for malaise/fatigue and weight loss.   Eyes:  Negative for discharge and redness.   Gastrointestinal:  Negative for nausea and vomiting.   Musculoskeletal:  Negative for joint pain and myalgias.   Skin:  Negative for itching and rash.              Objective     BP (!) 140/62 (BP Location: Right arm, Patient Position: Sitting, BP Cuff Size: Adult)   Pulse (!) 59   Temp 36.1 °C (97 °F) (Temporal)   Resp 15   Ht 1.829 m (6')   Wt 73 kg (161 lb)   SpO2 99%   BMI 21.84 kg/m²      Physical Exam  Constitutional:       General: He is not in acute distress.     Appearance: He is well-developed.   HENT:      Head: Normocephalic and atraumatic.      Right Ear: Tympanic membrane normal.      Left Ear: Tympanic membrane normal.   Eyes:      Conjunctiva/sclera: Conjunctivae normal.   Neck:      Comments: Mild tenderness and swelling left anterior neck. No redness or warmth. No fluctuance. No obvious goiter.   Cardiovascular:      Rate and Rhythm: Normal rate and regular rhythm.      Heart sounds: Normal heart sounds. No murmur heard.  Pulmonary:      Effort: Pulmonary effort is normal.      Breath sounds: Normal breath sounds. No wheezing.   Musculoskeletal:      Cervical back: Neck supple.   Skin:     General: Skin is warm and dry.      Findings: No rash.   Neurological:      Mental Status: He is alert.            RADIOLOGY RESULTS   CT-SOFT TISSUE NECK WITH  Result Date: 7/15/2025  7/15/2025 5:04 PM HISTORY/REASON FOR EXAM:  swelling. TECHNIQUE/EXAM DESCRIPTION AND  NUMBER OF VIEWS:  CT soft tissue neck with contrast. The study was performed on a helical multidetector CT scanner. Contiguous thin section helical images were obtained of the neck from the skull base through the thoracic inlet. 80 mL of Omnipaque 350 nonionic contrast was injected intravenously. Low dose optimization technique was utilized for this CT exam including automated exposure control and adjustment of the mA and/or kV according to patient size. COMPARISON: None. FINDINGS: BRAIN: Visualized portions of the brain are normal in appearance. TEMPORAL bones: The mastoid air cells and middle ear are clear. PARANASAL SINUSES: The paranasal sinuses are clear. CERVICAL spine: There is multilevel degenerative disc disease along with uncovertebral and posterior facet osteoarthrosis. No fracture is evident. There is trace retrolisthesis of C3 on C4. NODES: There is no adenopathy or mass within the neck. SALIVARY and THYROID gland: The parotid and submandibular glands are normal in appearance. The thyroid is heterogeneous with small nodules. Largest is on the left measuring 12 mm. This may be better assessed with ultrasound. AIRWAY: The airway appears patent. MEDIASTINUM: The superior mediastinum is normal in appearance. LUNGS: Mild changes of COPD.     1.  There is no definite abscess or adenopathy. 2.  Heterogeneous thyroid with small nodules.              1. Neck swelling  CREATININE    CT-SOFT TISSUE NECK WITH      2. Thyroid nodule greater than or equal to 1 cm in diameter incidentally noted on imaging study          Differential diagnosis, natural history, supportive care, and indications for immediate follow-up were discussed.     F/u pcp

## 2025-07-15 ENCOUNTER — HOSPITAL ENCOUNTER (OUTPATIENT)
Dept: RADIOLOGY | Facility: MEDICAL CENTER | Age: 80
End: 2025-07-15
Attending: FAMILY MEDICINE
Payer: COMMERCIAL

## 2025-07-15 DIAGNOSIS — R22.1 NECK SWELLING: ICD-10-CM

## 2025-07-15 PROCEDURE — 700117 HCHG RX CONTRAST REV CODE 255: Performed by: FAMILY MEDICINE

## 2025-07-15 PROCEDURE — 70491 CT SOFT TISSUE NECK W/DYE: CPT

## 2025-07-15 RX ADMIN — IOHEXOL 80 ML: 350 INJECTION, SOLUTION INTRAVENOUS at 17:25
